# Patient Record
Sex: FEMALE | Race: OTHER | HISPANIC OR LATINO | Employment: FULL TIME | URBAN - METROPOLITAN AREA
[De-identification: names, ages, dates, MRNs, and addresses within clinical notes are randomized per-mention and may not be internally consistent; named-entity substitution may affect disease eponyms.]

---

## 2022-04-10 ENCOUNTER — HISTORICAL (OUTPATIENT)
Dept: ADMINISTRATIVE | Facility: HOSPITAL | Age: 22
End: 2022-04-10

## 2022-04-28 VITALS
DIASTOLIC BLOOD PRESSURE: 66 MMHG | SYSTOLIC BLOOD PRESSURE: 105 MMHG | WEIGHT: 124.56 LBS | BODY MASS INDEX: 22.92 KG/M2 | OXYGEN SATURATION: 100 % | HEIGHT: 62 IN

## 2022-08-11 ENCOUNTER — OFFICE VISIT (OUTPATIENT)
Dept: INTERNAL MEDICINE | Facility: CLINIC | Age: 22
End: 2022-08-11
Payer: MEDICAID

## 2022-08-11 VITALS
HEART RATE: 56 BPM | DIASTOLIC BLOOD PRESSURE: 64 MMHG | TEMPERATURE: 98 F | SYSTOLIC BLOOD PRESSURE: 99 MMHG | HEIGHT: 62 IN | RESPIRATION RATE: 18 BRPM | BODY MASS INDEX: 25.23 KG/M2 | WEIGHT: 137.13 LBS

## 2022-08-11 DIAGNOSIS — Z13.29 SCREENING FOR THYROID DISORDER: ICD-10-CM

## 2022-08-11 DIAGNOSIS — Z13.220 SCREENING CHOLESTEROL LEVEL: ICD-10-CM

## 2022-08-11 DIAGNOSIS — D57.3 SICKLE CELL TRAIT: ICD-10-CM

## 2022-08-11 DIAGNOSIS — R14.3 EXCESSIVE FLATUS: ICD-10-CM

## 2022-08-11 DIAGNOSIS — Z00.00 WELLNESS EXAMINATION: ICD-10-CM

## 2022-08-11 DIAGNOSIS — Z13.1 SCREENING FOR DIABETES MELLITUS: Primary | ICD-10-CM

## 2022-08-11 DIAGNOSIS — N91.5 OLIGOMENORRHEA, UNSPECIFIED TYPE: ICD-10-CM

## 2022-08-11 DIAGNOSIS — Z11.3 ROUTINE SCREENING FOR STI (SEXUALLY TRANSMITTED INFECTION): ICD-10-CM

## 2022-08-11 DIAGNOSIS — Z72.0 VAPES NICOTINE CONTAINING SUBSTANCE: ICD-10-CM

## 2022-08-11 LAB
C TRACH DNA SPEC QL NAA+PROBE: NOT DETECTED
HIV 1+2 AB+HIV1 P24 AG SERPL QL IA: NONREACTIVE
N GONORRHOEA DNA SPEC QL NAA+PROBE: NOT DETECTED

## 2022-08-11 PROCEDURE — 1159F PR MEDICATION LIST DOCUMENTED IN MEDICAL RECORD: ICD-10-PCS | Mod: CPTII,,, | Performed by: NURSE PRACTITIONER

## 2022-08-11 PROCEDURE — 3078F DIAST BP <80 MM HG: CPT | Mod: CPTII,,, | Performed by: NURSE PRACTITIONER

## 2022-08-11 PROCEDURE — 1160F PR REVIEW ALL MEDS BY PRESCRIBER/CLIN PHARMACIST DOCUMENTED: ICD-10-PCS | Mod: CPTII,,, | Performed by: NURSE PRACTITIONER

## 2022-08-11 PROCEDURE — 3078F PR MOST RECENT DIASTOLIC BLOOD PRESSURE < 80 MM HG: ICD-10-PCS | Mod: CPTII,,, | Performed by: NURSE PRACTITIONER

## 2022-08-11 PROCEDURE — 36415 COLL VENOUS BLD VENIPUNCTURE: CPT | Performed by: NURSE PRACTITIONER

## 2022-08-11 PROCEDURE — 87389 HIV-1 AG W/HIV-1&-2 AB AG IA: CPT | Performed by: NURSE PRACTITIONER

## 2022-08-11 PROCEDURE — 3074F PR MOST RECENT SYSTOLIC BLOOD PRESSURE < 130 MM HG: ICD-10-PCS | Mod: CPTII,,, | Performed by: NURSE PRACTITIONER

## 2022-08-11 PROCEDURE — 3008F PR BODY MASS INDEX (BMI) DOCUMENTED: ICD-10-PCS | Mod: CPTII,,, | Performed by: NURSE PRACTITIONER

## 2022-08-11 PROCEDURE — 3074F SYST BP LT 130 MM HG: CPT | Mod: CPTII,,, | Performed by: NURSE PRACTITIONER

## 2022-08-11 PROCEDURE — 99214 OFFICE O/P EST MOD 30 MIN: CPT | Mod: PBBFAC | Performed by: NURSE PRACTITIONER

## 2022-08-11 PROCEDURE — 99213 PR OFFICE/OUTPT VISIT, EST, LEVL III, 20-29 MIN: ICD-10-PCS | Mod: S$PBB,,, | Performed by: NURSE PRACTITIONER

## 2022-08-11 PROCEDURE — 1160F RVW MEDS BY RX/DR IN RCRD: CPT | Mod: CPTII,,, | Performed by: NURSE PRACTITIONER

## 2022-08-11 PROCEDURE — 99213 OFFICE O/P EST LOW 20 MIN: CPT | Mod: S$PBB,,, | Performed by: NURSE PRACTITIONER

## 2022-08-11 PROCEDURE — 87591 N.GONORRHOEAE DNA AMP PROB: CPT | Performed by: NURSE PRACTITIONER

## 2022-08-11 PROCEDURE — 3008F BODY MASS INDEX DOCD: CPT | Mod: CPTII,,, | Performed by: NURSE PRACTITIONER

## 2022-08-11 PROCEDURE — 87491 CHLMYD TRACH DNA AMP PROBE: CPT | Performed by: NURSE PRACTITIONER

## 2022-08-11 PROCEDURE — 1159F MED LIST DOCD IN RCRD: CPT | Mod: CPTII,,, | Performed by: NURSE PRACTITIONER

## 2022-08-11 NOTE — PROGRESS NOTES
TRINI Marie   [facility]  [address] [facility] [address]      PATIENT NAME: Stefania Padilla  : 2000  DATE: 22  MRN: [id number]      Billing Provider: TRINI Marie  Level of Service: LA OFFICE/OUTPT VISIT, EST, LEVL III, 20-29 MIN  Patient PCP Information     Provider PCP Type    TRINI Marie General          Reason for Visit / Chief Complaint: Establish Care       History of Present Illness / Problem Focused Workflow     Stefania Padilla is a 22 y.o. [place] female presents to the clinic to establish PCP in [facility]. PMH sickle cell trait, and irregular cycles. Has not seen provider since moving to  2.5 yrs ago. Does not take any meds daily. Reports may have a cycle this month and may not have again for 3-4 months. Split second flutter, like heart skips a beat, onset 2 months, once a week. Reports bloating and gas with eating lettuce and fruits. Used to take med after eating with improvement of symptoms in [place]; does not recall name. Reports daily BM. Is fasting; will have labs drawn today. Denies chest pain, shortness of breath, cough, fever, headache, dizziness, weakness, abdominal pain, nausea, vomiting, diarrhea, constipation, dysuria, depression, anxiety, SI/HI.    Review of Systems     Review of Systems   Constitutional: Negative.    HENT: Negative.    Eyes: Negative.    Respiratory: Negative.    Cardiovascular: Negative.    Gastrointestinal: Negative.    Endocrine: Negative.    Genitourinary: Negative.    Musculoskeletal: Negative.    Integumentary:  Negative.   Neurological: Negative.    Psychiatric/Behavioral: Negative.         Medical / Social / Family History     Past Medical History:   Diagnosis Date    Depression     Dx few years ago, feels fine now    Sickle cell trait        History reviewed. No pertinent surgical history.    Social History  Ms. Padilla  reports that she  "has been smoking vaping with nicotine. She has never used smokeless tobacco. She reports current alcohol use. She reports that she does not use drugs.    Family History  Ms. Padilla's family history includes Diabetes in her maternal grandfather and maternal grandmother; Hypertension in her father, maternal grandfather, and maternal grandmother.    Medications and Allergies     Medications    Does not take any meds daily.    Allergies  Review of patient's allergies indicates:   Allergen Reactions    Furoxone [furazolidone]     Pineapple Itching       Physical Examination   Vital Signs  Temp: 98.2 °F (36.8 °C)  Temp src: Oral  Pulse: (!) 56  Resp: 18  BP: 99/64  BP Location: Left arm  Patient Position: Sitting  Pain Score: 0-No pain  Height and Weight  Height: 5' 2.21" (158 cm)  Weight: 62.2 kg (137 lb 2 oz)  BSA (Calculated - sq m): 1.65 sq meters  BMI (Calculated): 24.9  Weight in (lb) to have BMI = 25: 137.3]   Physical Exam  Vitals reviewed.   Constitutional:       Appearance: Normal appearance.   HENT:      Head: Normocephalic.   Eyes:      Pupils: Pupils are equal, round, and reactive to light.   Cardiovascular:      Rate and Rhythm: Normal rate and regular rhythm.      Heart sounds: Normal heart sounds.   Pulmonary:      Effort: Pulmonary effort is normal.      Breath sounds: Normal breath sounds.   Abdominal:      General: Bowel sounds are normal.      Palpations: Abdomen is soft.   Skin:     General: Skin is warm.   Neurological:      Mental Status: She is alert and oriented to person, place, and time.   Psychiatric:         Mood and Affect: Mood normal.         Speech: Speech normal.         Behavior: Behavior is cooperative.         Judgment: Judgment normal.           Results     Lab Results   Component Value Date    WBC 7.8 08/11/2022    RBC 4.39 08/11/2022    HGB 12.9 08/11/2022    HCT 36.4 (L) 08/11/2022    MCV 82.9 08/11/2022    MCH 29.4 08/11/2022    MCHC 35.4 08/11/2022    RDW 12.8 08/11/2022    "  08/11/2022    MPV 10.0 08/11/2022     CMP  Sodium Level   Date Value Ref Range Status   08/11/2022 142 136 - 145 mmol/L Final     Potassium Level   Date Value Ref Range Status   08/11/2022 4.7 3.5 - 5.1 mmol/L Final     Carbon Dioxide   Date Value Ref Range Status   08/11/2022 28 22 - 29 mmol/L Final     Blood Urea Nitrogen   Date Value Ref Range Status   08/11/2022 9.5 7.0 - 18.7 mg/dL Final     Creatinine   Date Value Ref Range Status   08/11/2022 0.67 0.55 - 1.02 mg/dL Final     Calcium Level Total   Date Value Ref Range Status   08/11/2022 9.8 8.4 - 10.2 mg/dL Final     Albumin Level   Date Value Ref Range Status   08/11/2022 4.5 3.5 - 5.0 gm/dL Final     Bilirubin Total   Date Value Ref Range Status   08/11/2022 1.1 <=1.5 mg/dL Final     Alkaline Phosphatase   Date Value Ref Range Status   08/11/2022 78 40 - 150 unit/L Final     Aspartate Aminotransferase   Date Value Ref Range Status   08/11/2022 12 5 - 34 unit/L Final     Alanine Aminotransferase   Date Value Ref Range Status   08/11/2022 10 0 - 55 unit/L Final     Lab Results   Component Value Date    CHOL 150 08/11/2022     Lab Results   Component Value Date    HDL 45 08/11/2022     No results found for: LDLCALC  Lab Results   Component Value Date    TRIG 77 08/11/2022     No results found for: CHOLHDL  Lab Results   Component Value Date    TSH 4.3457 08/11/2022     Lab Results   Component Value Date    PROTEINUA Negative 08/11/2022    LEUKOCYTESUR Negative 08/11/2022           Assessment and Plan (including Health Maintenance)     Plan: Virtual visit in 3-4 weeks and prn. Labs to be drawn prior to appt.         Health Maintenance Due   Topic Date Due    Pneumococcal Vaccines (Age 0-64) (1 - PCV) Never done    HPV Vaccines (1 - 2-dose series) Never done    TETANUS VACCINE  Never done    COVID-19 Vaccine (3 - Booster for Pfizer series) 12/13/2021    Pap Smear  Never done       Problem List Items Addressed This Visit        Renal/     Oligomenorrhea    Current Assessment & Plan     Referral to GYN to eval/treat.  Keep appt when scheduled.           Relevant Orders    Ambulatory referral/consult to Gynecology    TSH (Completed)    T4, Free (Completed)       Oncology    Sickle cell trait       GI    Excessive flatus    Current Assessment & Plan     Encouraged to take gas x with meals.   Avoid foods that cause excessive gas.              Other    Vapes nicotine containing substance    Current Assessment & Plan     Vapes daily.  Not ready to quit.   Discussed benefits of quitting including improved health, decreased cardiac/vascular/pulmonary/stroke risks as well as saving money.             Wellness examination    Current Assessment & Plan     Eye Exam -   Dental Exam -   Vaccinations: Flu - / Covid - 6/22/21 & 7/13/22 / Tetanus -  Labwork - ordered; pt is fasting.             Relevant Orders    CBC Auto Differential (Completed)    Comprehensive Metabolic Panel (Completed)    Urinalysis, Reflex to Urine Culture Urine, Clean Catch (Completed)      Other Visit Diagnoses     Screening for diabetes mellitus    -  Primary    Relevant Orders    Hemoglobin A1C (Completed)    Screening cholesterol level        Relevant Orders    Lipid Panel (Completed)    Screening for thyroid disorder        Routine screening for STI (sexually transmitted infection)        Relevant Orders    Chlamydia/GC, PCR (Completed)    Hepatitis Panel, Acute (Completed)    HIV 1/2 Ag/Ab (4th Gen) (Completed)    SYPHILIS ANTIBODY (WITH REFLEX RPR) (Completed)          Health Maintenance Topics with due status: Not Due       Topic Last Completion Date    Influenza Vaccine Not Due       Future Appointments   Date Time Provider Department Center   9/14/2022 12:45 PM [facility]             Signature:  TRINI Marie  [facility]  [address] [facility] [address]    Date of encounter:  8/11/22

## 2022-08-11 NOTE — ASSESSMENT & PLAN NOTE
Eye Exam -   Dental Exam -   Vaccinations: Flu - / Covid - 6/22/21 & 7/13/22 / Tetanus -  Labwork - ordered; pt is fasting.

## 2022-08-11 NOTE — ASSESSMENT & PLAN NOTE
Vapes daily.  Not ready to quit.   Discussed benefits of quitting including improved health, decreased cardiac/vascular/pulmonary/stroke risks as well as saving money.

## 2022-09-09 ENCOUNTER — TELEPHONE (OUTPATIENT)
Dept: INTERNAL MEDICINE | Facility: CLINIC | Age: 22
End: 2022-09-09
Payer: MEDICAID

## 2022-09-09 NOTE — TELEPHONE ENCOUNTER
Placed call to patient to inform her that appointment scheduled 9/14/22 has been rescheduled to to provider will not be in clinic and her new appointment will be 10/3/22 @ 9:15am. Patient did not answer, left message on voicemail.

## 2022-10-03 ENCOUNTER — OFFICE VISIT (OUTPATIENT)
Dept: INTERNAL MEDICINE | Facility: CLINIC | Age: 22
End: 2022-10-03
Payer: MEDICAID

## 2022-10-03 DIAGNOSIS — R00.2 PALPITATIONS: Primary | ICD-10-CM

## 2022-10-03 DIAGNOSIS — Z72.0 VAPES NICOTINE CONTAINING SUBSTANCE: ICD-10-CM

## 2022-10-03 DIAGNOSIS — N91.5 OLIGOMENORRHEA, UNSPECIFIED TYPE: ICD-10-CM

## 2022-10-03 DIAGNOSIS — R14.3 EXCESSIVE FLATUS: ICD-10-CM

## 2022-10-03 PROCEDURE — 1159F PR MEDICATION LIST DOCUMENTED IN MEDICAL RECORD: ICD-10-PCS | Mod: CPTII,95,, | Performed by: NURSE PRACTITIONER

## 2022-10-03 PROCEDURE — 99213 PR OFFICE/OUTPT VISIT, EST, LEVL III, 20-29 MIN: ICD-10-PCS | Mod: 95,,, | Performed by: NURSE PRACTITIONER

## 2022-10-03 PROCEDURE — 1159F MED LIST DOCD IN RCRD: CPT | Mod: CPTII,95,, | Performed by: NURSE PRACTITIONER

## 2022-10-03 PROCEDURE — 99213 OFFICE O/P EST LOW 20 MIN: CPT | Mod: 95,,, | Performed by: NURSE PRACTITIONER

## 2022-10-03 NOTE — PROGRESS NOTES
Audio Only Telehealth Visit     The patient location is: on way to airport; in [place] - passenger, not   The chief complaint leading to consultation is: palpitations  Visit type: Virtual visit with audio only (telephone)  Total time spent with patient: 25 mins     The reason for the audio only service rather than synchronous audio and video virtual visit was related to technical difficulties or patient preference/necessity.     Each patient to whom I provide medical services by telemedicine is:  (1) informed of the relationship between the physician and patient and the respective role of any other health care provider with respect to management of the patient; and (2) notified that they may decline to receive medical services by telemedicine and may withdraw from such care at any time. Patient verbally consented to receive this service via voice-only telephone call.    Billing Provider: TRINI Marie  Level of Service: [place] OFFICE/OUTPT VISIT, EST, LEVL III, 20-29 MIN  Patient PCP Information       Provider PCP Type    TRINI Marie General            Reason for Visit / Chief Complaint: Follow-up (Lab results)       History of Present Illness / Problem Focused Workflow     Stefania Padilla is a 22 y.o. [place]an female for lab results. PMH sickle cell trait, and irregular cycles. Labs reviewed with pt. Reports continued palpitations throughout the day with has increased before bed; has not reported for eval. Reports not improved with therapeutic measures; does endorse some anxiety. Amendable to further workup after return from trip. Gas has improved with gas X and reduction in gaseous foods. Lab results discussed with pt. Denies chest pain, shortness of breath, cough, fever, headache, dizziness, weakness, abdominal pain, nausea, vomiting, diarrhea, constipation, dysuria, depression, anxiety, SI/HI.    Review of Systems     Review of Systems   Constitutional: Negative.    HENT:  Negative.     Eyes: Negative.    Respiratory: Negative.     Cardiovascular:  Positive for palpitations.   Gastrointestinal: Negative.    Endocrine: Negative.    Genitourinary: Negative.    Musculoskeletal: Negative.    Integumentary:  Negative.   Neurological: Negative.    Psychiatric/Behavioral:  The patient is nervous/anxious.       Medical / Social / Family History     Past Medical History:   Diagnosis Date    Depression     Dx few years ago, feels fine now    Sickle cell trait        History reviewed. No pertinent surgical history.    Social History  Ms. Padilla reports that she has been smoking vaping with nicotine. She has never used smokeless tobacco. She reports current alcohol use. She reports that she does not use drugs.    Family History  's family history includes Diabetes in her maternal grandfather and maternal grandmother; Hypertension in her father, maternal grandfather, and maternal grandmother.    Medications and Allergies     Medications    Does not take any routine meds.       Allergies  Review of patient's allergies indicates:   Allergen Reactions    Furoxone [furazolidone]     Pineapple Itching       Physical Examination   Vital Signs  Pain Score: 0-No pain]    Physical Exam  Neurological:      Mental Status: She is alert and oriented to person, place, and time.   Psychiatric:         Mood and Affect: Mood normal.         Speech: Speech normal.         Behavior: Behavior normal. Behavior is cooperative.         Thought Content: Thought content normal.         Judgment: Judgment normal.       Results     Lab Results   Component Value Date    WBC 7.8 08/11/2022    RBC 4.39 08/11/2022    HGB 12.9 08/11/2022    HCT 36.4 (L) 08/11/2022    MCV 82.9 08/11/2022    MCH 29.4 08/11/2022    MCHC 35.4 08/11/2022    RDW 12.8 08/11/2022     08/11/2022    MPV 10.0 08/11/2022     Sodium Level   Date Value Ref Range Status   08/11/2022 142 136 - 145 mmol/L Final     Potassium Level   Date Value  Ref Range Status   08/11/2022 4.7 3.5 - 5.1 mmol/L Final     Carbon Dioxide   Date Value Ref Range Status   08/11/2022 28 22 - 29 mmol/L Final     Blood Urea Nitrogen   Date Value Ref Range Status   08/11/2022 9.5 7.0 - 18.7 mg/dL Final     Creatinine   Date Value Ref Range Status   08/11/2022 0.67 0.55 - 1.02 mg/dL Final     Calcium Level Total   Date Value Ref Range Status   08/11/2022 9.8 8.4 - 10.2 mg/dL Final     Albumin Level   Date Value Ref Range Status   08/11/2022 4.5 3.5 - 5.0 gm/dL Final     Bilirubin Total   Date Value Ref Range Status   08/11/2022 1.1 <=1.5 mg/dL Final     Alkaline Phosphatase   Date Value Ref Range Status   08/11/2022 78 40 - 150 unit/L Final     Aspartate Aminotransferase   Date Value Ref Range Status   08/11/2022 12 5 - 34 unit/L Final     Alanine Aminotransferase   Date Value Ref Range Status   08/11/2022 10 0 - 55 unit/L Final     Lab Results   Component Value Date    CHOL 150 08/11/2022     Lab Results   Component Value Date    HDL 45 08/11/2022     No results found for: LDLCALC  Lab Results   Component Value Date    TRIG 77 08/11/2022     No results found for: CHOLHDL  Lab Results   Component Value Date    TSH 4.3457 08/11/2022     Lab Results   Component Value Date    PROTEINUA Negative 08/11/2022    LEUKOCYTESUR Negative 08/11/2022     Lab Results   Component Value Date    HGBA1C 4.7 08/11/2022     No results found for: MICROALBUR, SYJA50VLX   No results found for this or any previous visit.         Assessment and Plan (including Health Maintenance)     Plan: RTC 4 weeks and prn. Echo/ekg must be completed.        Health Maintenance Due   Topic Date Due    Pneumococcal Vaccines (Age 0-64) (1 - PCV) Never done    HPV Vaccines (1 - 2-dose series) Never done    TETANUS VACCINE  Never done    COVID-19 Vaccine (3 - Booster for Pfizer series) 09/07/2021    Pap Smear  Never done    Influenza Vaccine (1) Never done       Problem List Items Addressed This Visit           Cardiac/Vascular    Palpitations - Primary    Current Assessment & Plan     EKG, echo and labs ordered.  Keep appts as scheduled.   ED precautions.  Vaping cessation discussed.         Relevant Orders    SCHEDULED EKG 12-LEAD (to Muse)    Echo       Renal/    Oligomenorrhea    Current Assessment & Plan     Keep GYN appt as scheduled later this month.            GI    Excessive flatus    Current Assessment & Plan     Continue OTC gas x prn.  Decrease dairy products.            Other    Vapes nicotine containing substance    Current Assessment & Plan     Continues to vape daily.  Not currently ready to quit.             The patient has no Health Maintenance topics of status Not Due    Future Appointments   Date Time Provider Department Center   10/26/2022  9:15 AM [facility], [facility] GYN [facility] GYN [place]    10/27/2022 11:30 AM [facility] ECHO 02 [facility] ECHO [place]    11/2/2022  9:00 AM TRINI Baires [facility] [place]            Signature:  TRINI Marie  [facility]  [address] [facility] [facility] [address]    Date of encounter: 10/3/22              This service was not originating from a related E/M service provided within the previous 7 days nor will  to an E/M service or procedure within the next 24 hours or my soonest available appointment.  Prevailing standard of care was able to be met in this audio-only visit.

## 2022-10-08 PROBLEM — R00.2 PALPITATIONS: Status: ACTIVE | Noted: 2022-10-08

## 2022-10-08 NOTE — ASSESSMENT & PLAN NOTE
EKG, echo and labs ordered.  Keep appts as scheduled.   ED precautions.  Vaping cessation discussed.

## 2022-10-26 ENCOUNTER — OFFICE VISIT (OUTPATIENT)
Dept: GYNECOLOGY | Facility: CLINIC | Age: 22
End: 2022-10-26
Payer: MEDICAID

## 2022-10-26 VITALS
TEMPERATURE: 98 F | SYSTOLIC BLOOD PRESSURE: 100 MMHG | WEIGHT: 139 LBS | DIASTOLIC BLOOD PRESSURE: 65 MMHG | HEART RATE: 85 BPM | BODY MASS INDEX: 25.58 KG/M2 | HEIGHT: 62 IN | RESPIRATION RATE: 20 BRPM | OXYGEN SATURATION: 100 %

## 2022-10-26 DIAGNOSIS — N94.6 DYSMENORRHEA: ICD-10-CM

## 2022-10-26 DIAGNOSIS — Z12.4 SCREENING FOR CERVICAL CANCER: Primary | ICD-10-CM

## 2022-10-26 PROCEDURE — 87624 HPV HI-RISK TYP POOLED RSLT: CPT

## 2022-10-26 PROCEDURE — 99214 OFFICE O/P EST MOD 30 MIN: CPT | Mod: PBBFAC

## 2022-10-26 RX ORDER — ETONOGESTREL AND ETHINYL ESTRADIOL VAGINAL RING .015; .12 MG/D; MG/D
1 RING VAGINAL
Qty: 1 EACH | Refills: 11 | Status: SHIPPED | OUTPATIENT
Start: 2022-10-26 | End: 2023-10-27

## 2022-10-26 NOTE — PROGRESS NOTES
"    [facility] GYNECOLOGY CLINIC NOTE     Stefania Padilla is a 22 y.o.  presenting to GYN clinic for dysmenorrhea.    Cycles were irregular last year stating cycles every other month, however since April cycles have been monthly, lasting 6 days. Cycles are not heavy however very painful. Pt reports no relief with midol/ibuprofen or tylenol. Currently sexually active, one female partner in the last year.        Gynecology  Never had a pap smear  Recent STI testing 2022, negative   OB History          0    Para   0    Term   0       0    AB   0    Living   0         SAB   0    IAB   0    Ectopic   0    Multiple   0    Live Births   0                Past Medical History:   Diagnosis Date             Sickle cell trait       History reviewed. No pertinent surgical history.   Current Outpatient Medications   Medication Instructions    etonogestreL-ethinyl estradioL (NUVARING) 0.12-0.015 mg/24 hr vaginal ring 1 each, Vaginal, Every 21 days, Insert one (1) ring vaginally and leave in place for three (3) weeks, then remove for one (1) week.     Social History     Tobacco Use    Smoking status: Every Day     Types: Vaping with nicotine    Smokeless tobacco: Never   Substance Use Topics    Alcohol use: Yes     Comment: beer 2-4 times monthly    Drug use: Never       Review of Systems  A comprehensive review of systems was negative.     Objective:     /65   Pulse 85   Temp 97.8 °F (36.6 °C)   Resp 20   Ht 5' 2" (1.575 m)   Wt 63 kg (139 lb)   LMP 10/13/2022   SpO2 100%   BMI 25.42 kg/m²   Physical Exam:  Gen: Well-nourished, well-developed female appearing stated age. Alert, cooperative, in no acute distress.  CV: rrr, no murmurs/rubs/gallops  Chest: ctabl, no wheezes/rales/rhonchi  Abdomen: Soft, non-tender, no masses.  Extrem: Extremities normal, atraumatic, non-tender calves.  External genitalia: Normal female genetalia without lesion, discharge or tenderness.   Speculum Exam: Vaginal " vault without discharge, nonodorous, no lesions/masses seen.  Cervical os visualized as closed, no lesions/masses.   Bimanual Exam: No cervical motion tenderness.  Uterus freely mobile.  Normal size uterus. No adnexal masses.  Nontender exam.   Note: RN chaperone present for entirety of exam.      Assessment:       22 y.o.  here for  1. Screening for cervical cancer  Liquid-Based Pap Smear, Screening Screening      2. Dysmenorrhea  Ambulatory referral/consult to Gynecology             Plan:   - counseled on various hormonal options to assist in reducing dysmenorrhea with cycles. Pt desires to be started on nuvaring. Continue ibuprofen or naproxen prn     Pap collected, will notify of any abnormal results       Problem List Items Addressed This Visit          Renal/    RESOLVED: Oligomenorrhea     Other Visit Diagnoses       Screening for cervical cancer    -  Primary    Relevant Orders    Liquid-Based Pap Smear, Screening Screening            Return to clinic in 1 year for WWE with NP     Discussed patient and plan with Dr. Provost Wang Reid MD   [employer] [facility], PGY4

## 2022-10-27 ENCOUNTER — HOSPITAL ENCOUNTER (OUTPATIENT)
Dept: CARDIOLOGY | Facility: HOSPITAL | Age: 22
Discharge: HOME | End: 2022-10-27
Attending: NURSE PRACTITIONER
Payer: MEDICAID

## 2022-10-27 DIAGNOSIS — R00.2 PALPITATIONS: ICD-10-CM

## 2022-10-27 LAB
AV INDEX (PROSTH): 0.72
AV MEAN GRADIENT: 3 MMHG
AV PEAK GRADIENT: 5 MMHG
AV VALVE AREA: 2.24 CM2
AV VELOCITY RATIO: 0.77
CV ECHO LV RWT: 0.37 CM
DOP CALC AO PEAK VEL: 1.15 M/S
DOP CALC AO VTI: 25.3 CM
DOP CALC LVOT AREA: 3.1 CM2
DOP CALC LVOT DIAMETER: 1.99 CM
DOP CALC LVOT PEAK VEL: 0.89 M/S
DOP CALC LVOT STROKE VOLUME: 56.58 CM3
DOP CALC MV VTI: 18 CM
DOP CALCLVOT PEAK VEL VTI: 18.2 CM
E WAVE DECELERATION TIME: 311.31 MSEC
E/A RATIO: 2.03
ECHO LV POSTERIOR WALL: 0.79 CM (ref 0.6–1.1)
EJECTION FRACTION: 60 %
FRACTIONAL SHORTENING: 33 % (ref 28–44)
HR MV ECHO: 70 BPM
INTERVENTRICULAR SEPTUM: 0.72 CM (ref 0.6–1.1)
LEFT ATRIUM SIZE: 2.75 CM
LEFT INTERNAL DIMENSION IN SYSTOLE: 2.84 CM (ref 2.1–4)
LEFT VENTRICLE DIASTOLIC VOLUME: 81.72 ML
LEFT VENTRICLE SYSTOLIC VOLUME: 30.62 ML
LEFT VENTRICULAR INTERNAL DIMENSION IN DIASTOLE: 4.27 CM (ref 3.5–6)
LEFT VENTRICULAR MASS: 96.48 G
LV LATERAL E/E' RATIO: 2.95 M/S
LVOT MG: 1.59 MMHG
LVOT MV: 0.57 CM/S
MV MEAN GRADIENT: 1 MMHG
MV PEAK A VEL: 0.29 M/S
MV PEAK E VEL: 0.59 M/S
MV PEAK GRADIENT: 2 MMHG
MV STENOSIS PRESSURE HALF TIME: 90.28 MS
MV VALVE AREA BY CONTINUITY EQUATION: 3.14 CM2
MV VALVE AREA P 1/2 METHOD: 2.44 CM2
PISA TR MAX VEL: 2.19 M/S
RA WIDTH: 3.4 CM
TDI LATERAL: 0.2 M/S
TR MAX PG: 19 MMHG
TRICUSPID ANNULAR PLANE SYSTOLIC EXCURSION: 1.59 CM

## 2022-10-27 PROCEDURE — 93306 TTE W/DOPPLER COMPLETE: CPT

## 2022-10-27 PROCEDURE — 93005 ELECTROCARDIOGRAM TRACING: CPT

## 2022-11-01 LAB
HPV16+18+H RISK 12 DNA CVX-IMP: NEGATIVE
INSULIN SERPL-ACNC: NORMAL U[IU]/ML
LAB AP BETHESDA CATEGORY: NORMAL
LAB AP CONTRACEPTIVES: NORMAL
LAB AP GYN MOLECULAR TESTING: NORMAL
LAB AP LMP DATE: NORMAL
LAB AP OCHS PAP SPECIMEN ADEQUACY: NORMAL
LAB AP OHS PAP INTERPRETATION: NORMAL
LAB AP PAP DISCLAIMER COMMENTS: NORMAL

## 2022-11-02 ENCOUNTER — OFFICE VISIT (OUTPATIENT)
Dept: INTERNAL MEDICINE | Facility: CLINIC | Age: 22
End: 2022-11-02
Payer: MEDICAID

## 2022-11-02 VITALS
WEIGHT: 140.38 LBS | TEMPERATURE: 98 F | DIASTOLIC BLOOD PRESSURE: 64 MMHG | RESPIRATION RATE: 20 BRPM | HEIGHT: 62 IN | BODY MASS INDEX: 25.83 KG/M2 | HEART RATE: 69 BPM | SYSTOLIC BLOOD PRESSURE: 95 MMHG

## 2022-11-02 DIAGNOSIS — R00.2 PALPITATIONS: Primary | ICD-10-CM

## 2022-11-02 DIAGNOSIS — F41.9 ANXIETY: ICD-10-CM

## 2022-11-02 PROCEDURE — 3008F BODY MASS INDEX DOCD: CPT | Mod: CPTII,,, | Performed by: NURSE PRACTITIONER

## 2022-11-02 PROCEDURE — 3078F DIAST BP <80 MM HG: CPT | Mod: CPTII,,, | Performed by: NURSE PRACTITIONER

## 2022-11-02 PROCEDURE — 1159F MED LIST DOCD IN RCRD: CPT | Mod: CPTII,,, | Performed by: NURSE PRACTITIONER

## 2022-11-02 PROCEDURE — 3078F PR MOST RECENT DIASTOLIC BLOOD PRESSURE < 80 MM HG: ICD-10-PCS | Mod: CPTII,,, | Performed by: NURSE PRACTITIONER

## 2022-11-02 PROCEDURE — 3074F SYST BP LT 130 MM HG: CPT | Mod: CPTII,,, | Performed by: NURSE PRACTITIONER

## 2022-11-02 PROCEDURE — 3008F PR BODY MASS INDEX (BMI) DOCUMENTED: ICD-10-PCS | Mod: CPTII,,, | Performed by: NURSE PRACTITIONER

## 2022-11-02 PROCEDURE — 99214 OFFICE O/P EST MOD 30 MIN: CPT | Mod: PBBFAC | Performed by: NURSE PRACTITIONER

## 2022-11-02 PROCEDURE — 3074F PR MOST RECENT SYSTOLIC BLOOD PRESSURE < 130 MM HG: ICD-10-PCS | Mod: CPTII,,, | Performed by: NURSE PRACTITIONER

## 2022-11-02 PROCEDURE — 1159F PR MEDICATION LIST DOCUMENTED IN MEDICAL RECORD: ICD-10-PCS | Mod: CPTII,,, | Performed by: NURSE PRACTITIONER

## 2022-11-02 PROCEDURE — 99214 OFFICE O/P EST MOD 30 MIN: CPT | Mod: S$PBB,,, | Performed by: NURSE PRACTITIONER

## 2022-11-02 PROCEDURE — 99214 PR OFFICE/OUTPT VISIT, EST, LEVL IV, 30-39 MIN: ICD-10-PCS | Mod: S$PBB,,, | Performed by: NURSE PRACTITIONER

## 2022-11-02 RX ORDER — BUSPIRONE HYDROCHLORIDE 5 MG/1
5 TABLET ORAL EVERY 8 HOURS PRN
Qty: 60 TABLET | Refills: 0 | Status: SHIPPED | OUTPATIENT
Start: 2022-11-02 | End: 2023-04-04 | Stop reason: SDUPTHER

## 2022-11-02 NOTE — PROGRESS NOTES
TRINI Marie   [facility]  [address] [address]      PATIENT NAME: Stefania Padilla  : 2000  DATE: 22  MRN: [id number]      Billing Provider: TRINI Marie  Level of Service: UT OFFICE/OUTPT VISIT, EST, LEVL IV, 30-39 MIN  Patient PCP Information       Provider PCP Type    TRINI Marie General            Reason for Visit / Chief Complaint: Follow-up (Test results, wants flu vaccine )       History of Present Illness / Problem Focused Workflow     Stefania Padilla is a 22 y.o. [place] female presents to the clinic for palpitations. Has established care with [facility] clinic. Echo and EKG results discussed with pt. Continues to endorse frequent palpitations. Does admit to some daily anxiety and inattention with racing thoughts. Gets told often at work that she does not complete one task prior to moving on to the next and worries a lot. Pt does not desire to take any meds daily as she doesn't feel she's responsible enough to remember to take it. Would like to try a prn med for anxiety after discussion of options. Denies chest pain, shortness of breath, cough, fever, headache, dizziness, weakness, abdominal pain, nausea, vomiting, diarrhea, constipation, dysuria, depression, SI/HI.    Review of Systems     Review of Systems   Constitutional: Negative.    HENT: Negative.     Eyes: Negative.    Respiratory: Negative.     Cardiovascular:  Positive for palpitations.   Gastrointestinal: Negative.    Endocrine: Negative.    Genitourinary: Negative.    Musculoskeletal: Negative.    Integumentary:  Negative.   Neurological: Negative.    Psychiatric/Behavioral:  The patient is nervous/anxious.       Medical / Social / Family History     Past Medical History:   Diagnosis Date    Depression     Dx few years ago, feels fine now    Sickle cell trait        History reviewed. No pertinent surgical  "history.    Social History  Ms. Padilla reports that she has been smoking vaping with nicotine. She has never used smokeless tobacco. She reports current alcohol use. She reports that she does not use drugs.    Family History  's family history includes Diabetes in her maternal grandfather and maternal grandmother; Hypertension in her father, maternal grandfather, and maternal grandmother.    Medications and Allergies     Medications  Medication List with Changes/Refills   New Medications    BUSPIRONE (BUSPAR) 5 MG TAB    Take 1 tablet (5 mg total) by mouth every 8 (eight) hours as needed (anxiety).   Current Medications    ETONOGESTREL-ETHINYL ESTRADIOL (NUVARING) 0.12-0.015 MG/24 HR VAGINAL RING    Place 1 each vaginally every 21 days. Insert one (1) ring vaginally and leave in place for three (3) weeks, then remove for one (1) week.         Allergies  Review of patient's allergies indicates:   Allergen Reactions    Furoxone [furazolidone]     Pineapple Itching       Physical Examination   Vital Signs  Temp: 97.7 °F (36.5 °C)  Temp src: Oral  Pulse: 69  Resp: 20  BP: 95/64  BP Location: Left arm  Patient Position: Sitting  Pain Score: 0-No pain  Height and Weight  Height: 5' 2.01" (157.5 cm)  Weight: 63.7 kg (140 lb 6.4 oz)  BSA (Calculated - sq m): 1.67 sq meters  BMI (Calculated): 25.7  Weight in (lb) to have BMI = 25: 136.4]   Physical Exam  Vitals reviewed.   Constitutional:       Appearance: Normal appearance.   HENT:      Head: Normocephalic.   Eyes:      Pupils: Pupils are equal, round, and reactive to light.   Cardiovascular:      Rate and Rhythm: Normal rate and regular rhythm.      Heart sounds: Normal heart sounds.   Pulmonary:      Effort: Pulmonary effort is normal.      Breath sounds: Normal breath sounds.   Abdominal:      General: Bowel sounds are normal.      Palpations: Abdomen is soft.   Skin:     General: Skin is warm.   Neurological:      Mental Status: She is alert and oriented to " person, place, and time.   Psychiatric:         Mood and Affect: Mood normal.         Speech: Speech normal.         Behavior: Behavior is cooperative.         Judgment: Judgment normal.         Results     Lab Results   Component Value Date    WBC 7.8 08/11/2022    RBC 4.39 08/11/2022    HGB 12.9 08/11/2022    HCT 36.4 (L) 08/11/2022    MCV 82.9 08/11/2022    MCH 29.4 08/11/2022    MCHC 35.4 08/11/2022    RDW 12.8 08/11/2022     08/11/2022    MPV 10.0 08/11/2022     CMP  Sodium Level   Date Value Ref Range Status   08/11/2022 142 136 - 145 mmol/L Final     Potassium Level   Date Value Ref Range Status   08/11/2022 4.7 3.5 - 5.1 mmol/L Final     Carbon Dioxide   Date Value Ref Range Status   08/11/2022 28 22 - 29 mmol/L Final     Blood Urea Nitrogen   Date Value Ref Range Status   08/11/2022 9.5 7.0 - 18.7 mg/dL Final     Creatinine   Date Value Ref Range Status   08/11/2022 0.67 0.55 - 1.02 mg/dL Final     Calcium Level Total   Date Value Ref Range Status   08/11/2022 9.8 8.4 - 10.2 mg/dL Final     Albumin Level   Date Value Ref Range Status   08/11/2022 4.5 3.5 - 5.0 gm/dL Final     Bilirubin Total   Date Value Ref Range Status   08/11/2022 1.1 <=1.5 mg/dL Final     Alkaline Phosphatase   Date Value Ref Range Status   08/11/2022 78 40 - 150 unit/L Final     Aspartate Aminotransferase   Date Value Ref Range Status   08/11/2022 12 5 - 34 unit/L Final     Alanine Aminotransferase   Date Value Ref Range Status   08/11/2022 10 0 - 55 unit/L Final     Lab Results   Component Value Date    CHOL 150 08/11/2022     Lab Results   Component Value Date    HDL 45 08/11/2022     No results found for: LDLCALC  Lab Results   Component Value Date    TRIG 77 08/11/2022     No results found for: CHOLHDL  Lab Results   Component Value Date    TSH 4.3457 08/11/2022     Lab Results   Component Value Date    PROTEINUA Negative 08/11/2022    LEUKOCYTESUR Negative 08/11/2022     Transthoracic echo (TTE) complete  Order#  [id number]  Reading physician: Ethan Wen MD Ordering physician: TRINI Baires Study date: 10/27/22     Reason for Exam  Priority: Routine  Dx: Palpitations [R00.2 (ICD-10-CM)]     View Images Vital Vitrea     Show images for Echo  Summary    The left ventricle is normal in size with normal systolic function.  The estimated ejection fraction is 60%.  Normal left ventricular diastolic function.  Normal right ventricular size with normal right ventricular systolic function.  There is no pulmonary hypertension.     Vitals    Height Weight BMI (Calculated) BSA (Calculated - sq m) BP Pulse             Echocardiography Findings      Left Ventricle    The left ventricle is normal in size with normal systolic function. The estimated ejection fraction is 60%. There is normal diastolic function.     Right Ventricle    Normal cavity size and systolic function.     Left Atrium    The left atrial volume index is normal.     Right Atrium    The right atrium is normal in size.     Aortic Valve    The aortic valve appears structurally normal. The valve is trileaflet. Trace regurgitation.     Mitral Valve    The mean pressure gradient across the mitral valve is 1 mmHg at a heart rate of 70 bpm. The mitral valve appears structurally normal. There is trace mitral valve regurgitation. The estimated mitral valve area by pressure half time is 2.44 cm2.     Tricuspid Valve    The tricuspid valve appears structurally normal. There is trace regurgitation. There is no pulmonary hypertension. The estimated PA systolic pressure is greater than 19 mmHg.     Pulmonic Valve    Pulmonic valve is structurally normal. Trace regurgitation.     IVC/SVC    IVC normal.       Exam Details    Performed Procedure Technologist     Transthoracic echo (TTE) complete RT Elena           Begin Exam End Exam     10/27/2022 11:25 AM 10/27/2022 11:54 AM              2D Measurements    Dimensions   LVIDd 4.27 cm         LVIDs 2.84 cm         IVS  0.72 cm         Posterior Wall 0.79 cm         FS 33 %         LA size 2.75 cm         LV mass 96.48 g          Dimensions   TAPSE 1.59 cm         RA Width 3.4 cm                 Doppler Measurements - Diastolic Filling    Diastolic Filling   E/A ratio 2.03           E wave deceleration time 311.31 msec              Doppler Measurements - Aortic Valve    Stenosis   LVOT diameter 1.99 cm         LVOT area 3.1 cm2         LVOT peak christopher 0.89 m/s         LVOT peak VTI 18.2 cm         Ao peak christopher 1.15 m/s         Ao VTI 25.3 cm         AV valve area 2.24 cm2         AV mean gradient 3 mmHg         AV peak gradient 5 mmHg         AV Velocity Ratio 0.77          AV index (prosthetic) 0.72                  Doppler Measurements - Mitral Valve    Stenosis   MV mean gradient 1 mmHg         MV peak gradient 2 mmHg         MV valve area p 1/2 method 2.44 cm2         MV valve area by continuity eq 3.14 cm2          PISA-MS   MV Peak E Christopher 0.59 m/s                 Doppler Measurements - Shunt Ratio    Shunt Ratio   LVOT stroke volume 56.58 cm3               Assessment and Plan (including Health Maintenance)     Plan: Virtual visit in 4 weeks and prn.         Health Maintenance Due   Topic Date Due    Pneumococcal Vaccines (Age 0-64) (1 - PCV) Never done    HPV Vaccines (1 - 2-dose series) Never done    Chlamydia Screening  Never done    TETANUS VACCINE  Never done    COVID-19 Vaccine (3 - Booster for Pfizer series) 09/07/2021    Influenza Vaccine (1) Never done       Problem List Items Addressed This Visit          Psychiatric    Anxiety    Current Assessment & Plan     Rx buspar 5 mg TID prn.  Take meds as prescribed.  Practice deep breathing or abdominal breathing exercises when anxiety occurs.  Exercise daily. Get sunlight daily.  Avoid caffeine, alcohol and stimulants.  Do not use illicit drugs.  Practice positive phrases and repeat throughout the day, yoga, lavender scents or Chamomile tea will help anxiety.  Set healthy  boundaries, avoid people and conversations that increase stress.  Reports any symptoms of suicidal or homicidal ideations immediately, go to nearest emergency room.              Cardiac/Vascular    Palpitations - Primary    Current Assessment & Plan     EKG and Echo unremarkable.  Possible cause anxiety or ADHD.  Will continue to monitor.             Health Maintenance Topics with due status: Not Due       Topic Last Completion Date    Pap Smear 10/26/2022       Future Appointments   Date Time Provider Department Center   12/1/2022 12:45 PM [facility]    10/27/2023 10:10 AM CÉSAR Bhakta [facility]            Signature:  TRINI Marie  [facility]  [address] [address]    Date of encounter: 11/2/22

## 2022-11-02 NOTE — ASSESSMENT & PLAN NOTE
Rx buspar 5 mg TID prn.  Take meds as prescribed.  Practice deep breathing or abdominal breathing exercises when anxiety occurs.  Exercise daily. Get sunlight daily.  Avoid caffeine, alcohol and stimulants.  Do not use illicit drugs.  Practice positive phrases and repeat throughout the day, yoga, lavender scents or Chamomile tea will help anxiety.  Set healthy boundaries, avoid people and conversations that increase stress.  Reports any symptoms of suicidal or homicidal ideations immediately, go to nearest emergency room.

## 2022-11-16 ENCOUNTER — OFFICE VISIT (OUTPATIENT)
Dept: URGENT CARE | Facility: CLINIC | Age: 22
End: 2022-11-16
Payer: MEDICAID

## 2022-11-16 VITALS
SYSTOLIC BLOOD PRESSURE: 102 MMHG | TEMPERATURE: 98 F | RESPIRATION RATE: 18 BRPM | HEART RATE: 65 BPM | WEIGHT: 142 LBS | DIASTOLIC BLOOD PRESSURE: 65 MMHG | OXYGEN SATURATION: 97 % | HEIGHT: 62 IN | BODY MASS INDEX: 26.13 KG/M2

## 2022-11-16 DIAGNOSIS — B00.1 COLD SORE: Primary | ICD-10-CM

## 2022-11-16 PROBLEM — D64.9 ANEMIA: Status: ACTIVE | Noted: 2022-11-16

## 2022-11-16 PROCEDURE — 99214 OFFICE O/P EST MOD 30 MIN: CPT | Mod: PBBFAC | Performed by: NURSE PRACTITIONER

## 2022-11-16 PROCEDURE — 99213 PR OFFICE/OUTPT VISIT, EST, LEVL III, 20-29 MIN: ICD-10-PCS | Mod: S$PBB,,, | Performed by: NURSE PRACTITIONER

## 2022-11-16 PROCEDURE — 99213 OFFICE O/P EST LOW 20 MIN: CPT | Mod: S$PBB,,, | Performed by: NURSE PRACTITIONER

## 2022-11-16 RX ORDER — MUPIROCIN 20 MG/G
OINTMENT TOPICAL 3 TIMES DAILY
Qty: 22 G | Refills: 0 | Status: SHIPPED | OUTPATIENT
Start: 2022-11-16 | End: 2022-11-23

## 2022-11-16 RX ORDER — VALACYCLOVIR HYDROCHLORIDE 1 G/1
2000 TABLET, FILM COATED ORAL EVERY 12 HOURS
Qty: 4 TABLET | Refills: 0 | Status: SHIPPED | OUTPATIENT
Start: 2022-11-16 | End: 2023-04-04 | Stop reason: SDUPTHER

## 2022-11-17 NOTE — PROGRESS NOTES
"Subjective:       Patient ID: Stefania Padilla is a 22 y.o. female.    Vitals:  height is 5' 2.01" (1.575 m) and weight is 64.4 kg (142 lb). Her oral temperature is 98.4 °F (36.9 °C). Her blood pressure is 102/65 and her pulse is 65. Her respiration is 18 and oxygen saturation is 97%.     Chief Complaint: Mouth Lesions (Since 11/14)    HPI hx notable for anemia. Gets cold sores, takes acyclovir, and is out of medicine. Needs refill.   ROS    Objective:      Physical Exam   Constitutional: She is oriented to person, place, and time. She does not appear ill. normal  HENT:   Head:       Nose: No rhinorrhea or congestion.   Eyes: Conjunctivae are normal. Pupils are equal, round, and reactive to light. Extraocular movement intact   Pulmonary/Chest: Effort normal.   Abdominal: Normal appearance.   Neurological: She is alert and oriented to person, place, and time.   Skin: lesion   Psychiatric: Her behavior is normal. Mood, judgment and thought content normal.   Vitals reviewed.      Assessment:       1. Cold sore          Plan:         Cold sore    Other orders  -     valACYclovir (VALTREX) 1000 MG tablet; Take 2 tablets (2,000 mg total) by mouth every 12 (twelve) hours. for 1 day  Dispense: 4 tablet; Refill: 0  -     mupirocin (BACTROBAN) 2 % ointment; Apply topically 3 (three) times daily. for 7 days  Dispense: 22 g; Refill: 0          Start Valtrex tonight.  Apply Mupirocin for 5-7 days.         "

## 2022-12-01 ENCOUNTER — TELEPHONE (OUTPATIENT)
Dept: INTERNAL MEDICINE | Facility: CLINIC | Age: 22
End: 2022-12-01
Payer: MEDICAID

## 2022-12-01 NOTE — TELEPHONE ENCOUNTER
Unable to contact pt via phone for telemedicine visit on today, left message on voicemail for pt to return call to clinic. Provider aware, will r/s accordingly.

## 2023-01-27 ENCOUNTER — TELEPHONE (OUTPATIENT)
Dept: INTERNAL MEDICINE | Facility: CLINIC | Age: 23
End: 2023-01-27
Payer: MEDICAID

## 2023-01-27 NOTE — TELEPHONE ENCOUNTER
Unable to contact pt via phone for telemed visit on 1/26/22, message was left on VM at that time for pt to return call to clinic.

## 2023-04-04 ENCOUNTER — OFFICE VISIT (OUTPATIENT)
Dept: INTERNAL MEDICINE | Facility: CLINIC | Age: 23
End: 2023-04-04
Payer: MEDICAID

## 2023-04-04 VITALS
BODY MASS INDEX: 26.8 KG/M2 | WEIGHT: 145.63 LBS | HEIGHT: 62 IN | SYSTOLIC BLOOD PRESSURE: 110 MMHG | TEMPERATURE: 98 F | HEART RATE: 77 BPM | DIASTOLIC BLOOD PRESSURE: 62 MMHG

## 2023-04-04 DIAGNOSIS — Z72.0 VAPES NICOTINE CONTAINING SUBSTANCE: Primary | Chronic | ICD-10-CM

## 2023-04-04 DIAGNOSIS — F41.9 ANXIETY: Chronic | ICD-10-CM

## 2023-04-04 DIAGNOSIS — B00.1 HERPES LABIALIS: Chronic | ICD-10-CM

## 2023-04-04 DIAGNOSIS — Z13.220 SCREENING CHOLESTEROL LEVEL: ICD-10-CM

## 2023-04-04 DIAGNOSIS — Z11.3 ROUTINE SCREENING FOR STI (SEXUALLY TRANSMITTED INFECTION): ICD-10-CM

## 2023-04-04 DIAGNOSIS — Z00.00 WELLNESS EXAMINATION: ICD-10-CM

## 2023-04-04 DIAGNOSIS — H10.13 ALLERGIC CONJUNCTIVITIS OF BOTH EYES: ICD-10-CM

## 2023-04-04 PROCEDURE — 3074F PR MOST RECENT SYSTOLIC BLOOD PRESSURE < 130 MM HG: ICD-10-PCS | Mod: CPTII,,, | Performed by: NURSE PRACTITIONER

## 2023-04-04 PROCEDURE — 3008F PR BODY MASS INDEX (BMI) DOCUMENTED: ICD-10-PCS | Mod: CPTII,,, | Performed by: NURSE PRACTITIONER

## 2023-04-04 PROCEDURE — 3074F SYST BP LT 130 MM HG: CPT | Mod: CPTII,,, | Performed by: NURSE PRACTITIONER

## 2023-04-04 PROCEDURE — 3078F PR MOST RECENT DIASTOLIC BLOOD PRESSURE < 80 MM HG: ICD-10-PCS | Mod: CPTII,,, | Performed by: NURSE PRACTITIONER

## 2023-04-04 PROCEDURE — 1159F MED LIST DOCD IN RCRD: CPT | Mod: CPTII,,, | Performed by: NURSE PRACTITIONER

## 2023-04-04 PROCEDURE — 99213 OFFICE O/P EST LOW 20 MIN: CPT | Mod: PBBFAC | Performed by: NURSE PRACTITIONER

## 2023-04-04 PROCEDURE — 99406 BEHAV CHNG SMOKING 3-10 MIN: CPT | Mod: S$PBB,,, | Performed by: NURSE PRACTITIONER

## 2023-04-04 PROCEDURE — 1159F PR MEDICATION LIST DOCUMENTED IN MEDICAL RECORD: ICD-10-PCS | Mod: CPTII,,, | Performed by: NURSE PRACTITIONER

## 2023-04-04 PROCEDURE — 1160F RVW MEDS BY RX/DR IN RCRD: CPT | Mod: CPTII,,, | Performed by: NURSE PRACTITIONER

## 2023-04-04 PROCEDURE — 99214 PR OFFICE/OUTPT VISIT, EST, LEVL IV, 30-39 MIN: ICD-10-PCS | Mod: 25,S$PBB,, | Performed by: NURSE PRACTITIONER

## 2023-04-04 PROCEDURE — 99406 PR TOBACCO USE CESSATION INTERMEDIATE 3-10 MINUTES: ICD-10-PCS | Mod: S$PBB,,, | Performed by: NURSE PRACTITIONER

## 2023-04-04 PROCEDURE — 1160F PR REVIEW ALL MEDS BY PRESCRIBER/CLIN PHARMACIST DOCUMENTED: ICD-10-PCS | Mod: CPTII,,, | Performed by: NURSE PRACTITIONER

## 2023-04-04 PROCEDURE — 99214 OFFICE O/P EST MOD 30 MIN: CPT | Mod: 25,S$PBB,, | Performed by: NURSE PRACTITIONER

## 2023-04-04 PROCEDURE — 3078F DIAST BP <80 MM HG: CPT | Mod: CPTII,,, | Performed by: NURSE PRACTITIONER

## 2023-04-04 PROCEDURE — 3008F BODY MASS INDEX DOCD: CPT | Mod: CPTII,,, | Performed by: NURSE PRACTITIONER

## 2023-04-04 RX ORDER — VALACYCLOVIR HYDROCHLORIDE 1 G/1
2000 TABLET, FILM COATED ORAL EVERY 12 HOURS
Qty: 4 TABLET | Refills: 0 | Status: SHIPPED | OUTPATIENT
Start: 2023-04-04 | End: 2023-04-05

## 2023-04-04 RX ORDER — BUSPIRONE HYDROCHLORIDE 5 MG/1
5 TABLET ORAL EVERY 8 HOURS PRN
Qty: 60 TABLET | Refills: 0 | Status: SHIPPED | OUTPATIENT
Start: 2023-04-04

## 2023-04-04 RX ORDER — VALACYCLOVIR HYDROCHLORIDE 1 G/1
2000 TABLET, FILM COATED ORAL EVERY 12 HOURS
Qty: 4 TABLET | Refills: 0 | Status: SHIPPED | OUTPATIENT
Start: 2023-04-04 | End: 2023-04-04 | Stop reason: SDUPTHER

## 2023-04-04 RX ORDER — OLOPATADINE HYDROCHLORIDE 1 MG/ML
1 SOLUTION/ DROPS OPHTHALMIC 2 TIMES DAILY PRN
Qty: 5 ML | Refills: 1 | Status: SHIPPED | OUTPATIENT
Start: 2023-04-04

## 2023-04-04 RX ORDER — BUSPIRONE HYDROCHLORIDE 5 MG/1
5 TABLET ORAL EVERY 8 HOURS PRN
Qty: 60 TABLET | Refills: 0 | Status: SHIPPED | OUTPATIENT
Start: 2023-04-04 | End: 2023-04-04 | Stop reason: SDUPTHER

## 2023-04-04 NOTE — PROGRESS NOTES
Bozena Sal, TRINI   [facility]  [address] [address]      PATIENT NAME: Stefania Padilla  : 2000  DATE: 23  MRN: [id number]      Reason for Visit / Chief Complaint: medication evaluation (Palpitations have stopped with medication, Buspar 5 mg working, refused vaccines)       History of Present Illness / Problem Focused Workflow     Stefania Padilla is a 23 y.o. [place] female presents to the clinic for anxiety and palpitation f/u. PMH sickle cell trait, and dysmenorrhea. Followed by [facility].    Today, reports anxiety improved with buspar maybe 2x/week. Reports palpitations have resolved. Has switched jobs and anxiety has improved. Reports redness to corner or eyes periodically onset in Feb; showed video on cell phone. Requesting refill on valacyclovir prn for cold sores. Continues to vape nicotine containing substance daily. Declines vaccines today. Denies chest pain, shortness of breath, cough, fever, headache, dizziness, weakness, abdominal pain, nausea, vomiting, diarrhea, constipation, dysuria, depression, anxiety, SI/HI.    Review of Systems     Review of Systems     See HPI for details    Constitutional: Denies Change in appetite. Denies Chills. Denies Fever. Denies Night sweats.  Eye: Denies Blurred vision. Denies Discharge. Denies Eye pain. Admits Itchy eyes.  ENT: Denies Decreased hearing. Denies Sore throat. Denies Swollen glands.  Respiratory: Denies Cough. Denies Shortness of breath. Denies Shortness of breath with exertion. Denies Wheezing.  Cardiovascular: Denies Chest pain at rest. Denies Chest pain with exertion. Denies Irregular heartbeat. Denies Palpitations. Denies Edema.  Gastrointestinal: Denies Abdominal pain. Denies Diarrhea. Denies Nausea. Denies Vomiting. Denies Hematemesis or Hematochezia.  Genitourinary: Denies Dysuria. Denies Urinary frequency. Denies Urinary urgency.  Denies Blood in urine.  Endocrine: Denies Cold intolerance. Denies Excessive thirst. Denies Heat intolerance. Denies Weight loss. Denies Weight gain.  Musculoskeletal: Denies Painful joints. Denies Weakness.  Integumentary: Denies Rash. Denies Itching. Denies Dry skin.  Neurologic: Denies Dizziness. Denies Fainting. Denies Headache.  Psychiatric: Denies Depression. Denies Anxiety. Denies Suicidal/Homicidal ideations.    All Other ROS: Negative except as stated in HPI.     Medical / Surgical / Social / Family History       ----------------------------  Depression      Comment:  Dx few years ago, feels fine now  Sickle cell trait     History reviewed. No pertinent surgical history.    Social History     Socioeconomic History    Marital status: Single   Tobacco Use    Smoking status: Every Day     Types: Vaping with nicotine    Smokeless tobacco: Never   Substance and Sexual Activity    Alcohol use: Yes     Comment: beer 2-4 times monthly    Drug use: Never    Sexual activity: Yes     Partners: Female   Social History Narrative    ** Merged History Encounter **          Social Determinants of Health     Food Insecurity: No Food Insecurity    Worried About Running Out of Food in the Last Year: Never true    Ran Out of Food in the Last Year: Never true   Transportation Needs: No Transportation Needs    Lack of Transportation (Medical): No    Lack of Transportation (Non-Medical): No   Physical Activity: Inactive    Days of Exercise per Week: 0 days    Minutes of Exercise per Session: 0 min   Social Connections: Moderately Isolated    Frequency of Communication with Friends and Family: Once a week    Frequency of Social Gatherings with Friends and Family: More than three times a week    Attends Caoda[employer] Services: 1 to 4 times per year    Active Member of Clubs or Organizations: No    Attends Club or Organization Meetings: Never    Marital Status: Never    Housing Stability: Low Risk     Unable to Pay for Housing in  "the Last Year: No    Number of Places Lived in the Last Year: 1    Unstable Housing in the Last Year: No        Family History   Problem Relation Age of Onset    Hypertension Maternal Grandmother     Diabetes Maternal Grandmother     Hypertension Maternal Grandfather     Diabetes Maternal Grandfather     Hypertension Father         Medications and Allergies     Medications  Current Outpatient Medications   Medication Instructions    busPIRone (BUSPAR) 5 mg, Oral, Every 8 hours PRN    etonogestreL-ethinyl estradioL (NUVARING) 0.12-0.015 mg/24 hr vaginal ring 1 each, Vaginal, Every 21 days, Insert one (1) ring vaginally and leave in place for three (3) weeks, then remove for one (1) week.    olopatadine (PATANOL) 0.1 % ophthalmic solution 1 drop, Both Eyes, 2 times daily PRN    valACYclovir (VALTREX) 2,000 mg, Oral, Every 12 hours         Allergies  Review of patient's allergies indicates:   Allergen Reactions    Furoxone [furazolidone]     Pineapple Itching       Physical Examination     /62 (BP Location: Right arm, Patient Position: Sitting, BP Method: Medium (Manual))   Pulse 77   Temp 98.4 °F (36.9 °C) (Oral)   Ht 5' 2.01" (1.575 m)   Wt 66 kg (145 lb 9.6 oz)   LMP 03/01/2023 (Exact Date)   BMI 26.62 kg/m²     Physical Exam    General: Alert and oriented, No acute distress.  Head: Normocephalic, Atraumatic.  Eye: Pupils are equal, round and reactive to light, Extraocular movements are intact, Sclera non-icteric.  Ears/Nose/Throat: Normal, Mucosa moist,Clear.  Neck/Thyroid: Supple, Non-tender, No carotid bruit, No lymphadenopathy, No JVD, Full range of motion.  Respiratory: Clear to auscultation bilaterally; No wheezes, rales or rhonchi,Non-labored respirations, Symmetrical chest wall expansion.  Cardiovascular: Regular rate and rhythm, S1/S2 normal, No murmurs, rubs or gallops.  Gastrointestinal: Soft, Non-tender, Non-distended, Normal bowel sounds, No palpable organomegaly.  Musculoskeletal: Normal " range of motion.  Integumentary: Warm, Dry, Intact, No suspicious lesions or rashes.  Extremities: No clubbing, cyanosis or edema  Neurologic: No focal deficits, Cranial Nerves II-XII are grossly intact, Motor strength normal upper and lower extremities, Sensory exam intact.  Psychiatric: Normal interaction, Coherent speech, Euthymic mood, Appropriate affect       Results     Lab Results   Component Value Date    WBC 7.8 08/11/2022    HGB 12.9 08/11/2022    HCT 36.4 (L) 08/11/2022     08/11/2022    CHOL 150 08/11/2022    TRIG 77 08/11/2022    ALT 10 08/11/2022    AST 12 08/11/2022     08/11/2022    K 4.7 08/11/2022    CREATININE 0.67 08/11/2022    BUN 9.5 08/11/2022    CO2 28 08/11/2022    TSH 4.3457 08/11/2022    HGBA1C 4.7 08/11/2022         Assessment and Plan (including Health Maintenance)     Plan:     1. Anxiety  Resolved with buspar; refilled.  Take meds as prescribed.  Practice deep breathing or abdominal breathing exercises when anxiety occurs.  Exercise daily. Get sunlight daily.  Avoid caffeine, alcohol and stimulants.  Do not use illicit drugs.  Practice positive phrases and repeat throughout the day, yoga, lavender scents or Chamomile tea will help anxiety.  Set healthy boundaries, avoid people and conversations that increase stress.  Reports any symptoms of suicidal or homicidal ideations immediately, go to nearest emergency room.    - TSH; Future    2. Allergic conjunctivitis of both eyes  Rx patanol BID prn.  Do not scratch or rub excessively.  Wash hands thoroughly.    3. Herpes labialis  Refilled valtrex prn outbreak.  Do not scratch, pick, rub, kiss, or other skin contact while present; lesions are contagious.     4. Vapes nicotine containing substance  Tobacco cessation discussed; total time 3 mins.   Pt not ready to quit.  Declines referral to Smoking Cessation program.  Discussed benefits of quitting including improved health, decreased cardiac/vascular/pulmonary/stroke risks as  well as saving money.      5. Routine screening for STI (sexually transmitted infection)  - Chlamydia/GC, PCR; Future  - Hepatitis Panel, Acute; Future  - HIV 1/2 Ag/Ab (4th Gen); Future  - SYPHILIS ANTIBODY (WITH REFLEX RPR); Future      Problem List Items Addressed This Visit          Psychiatric    Anxiety (Chronic)    Relevant Orders    TSH       Ophtho    Allergic conjunctivitis of both eyes       ID    Herpes labialis       Other    Vapes nicotine containing substance - Primary (Chronic)     Other Visit Diagnoses       Wellness examination        Relevant Orders    CBC Auto Differential    Comprehensive Metabolic Panel    Urinalysis, Reflex to Urine Culture    Screening cholesterol level        Relevant Orders    Lipid Panel              Health Maintenance Due   Topic Date Due    Pneumococcal Vaccines (Age 0-64) (1 - PCV) Never done    HPV Vaccines (1 - 2-dose series) Never done    TETANUS VACCINE  Never done    COVID-19 Vaccine (3 - Booster for Pfizer series) 09/07/2021    Influenza Vaccine (1) Never done       Follow up in about 5 months (around 9/4/2023) for Wellness with labs one week prior to appt. .        Signature:  TRINI Marie  [facility]  [address] [facility] [address]

## 2023-10-27 ENCOUNTER — LAB VISIT (OUTPATIENT)
Dept: LAB | Facility: HOSPITAL | Age: 23
End: 2023-10-27
Attending: NURSE PRACTITIONER
Payer: MEDICAID

## 2023-10-27 ENCOUNTER — OFFICE VISIT (OUTPATIENT)
Dept: GYNECOLOGY | Facility: CLINIC | Age: 23
End: 2023-10-27
Payer: MEDICAID

## 2023-10-27 VITALS
TEMPERATURE: 98 F | SYSTOLIC BLOOD PRESSURE: 99 MMHG | DIASTOLIC BLOOD PRESSURE: 60 MMHG | HEART RATE: 83 BPM | BODY MASS INDEX: 27.49 KG/M2 | RESPIRATION RATE: 20 BRPM | WEIGHT: 149.38 LBS | OXYGEN SATURATION: 100 % | HEIGHT: 62 IN

## 2023-10-27 DIAGNOSIS — N91.4 SECONDARY OLIGOMENORRHEA: ICD-10-CM

## 2023-10-27 DIAGNOSIS — Z13.220 SCREENING CHOLESTEROL LEVEL: ICD-10-CM

## 2023-10-27 DIAGNOSIS — Z11.3 ROUTINE SCREENING FOR STI (SEXUALLY TRANSMITTED INFECTION): ICD-10-CM

## 2023-10-27 DIAGNOSIS — F41.9 ANXIETY: Chronic | ICD-10-CM

## 2023-10-27 DIAGNOSIS — Z00.00 WELLNESS EXAMINATION: ICD-10-CM

## 2023-10-27 DIAGNOSIS — Z01.419 ENCOUNTER FOR ANNUAL ROUTINE GYNECOLOGICAL EXAMINATION: Primary | ICD-10-CM

## 2023-10-27 LAB
ALBUMIN SERPL-MCNC: 4.3 G/DL (ref 3.5–5)
ALBUMIN/GLOB SERPL: 1.2 RATIO (ref 1.1–2)
ALP SERPL-CCNC: 93 UNIT/L (ref 40–150)
ALT SERPL-CCNC: 14 UNIT/L (ref 0–55)
AST SERPL-CCNC: 17 UNIT/L (ref 5–34)
BASOPHILS # BLD AUTO: 0.02 X10(3)/MCL
BASOPHILS NFR BLD AUTO: 0.3 %
BILIRUB SERPL-MCNC: 1.2 MG/DL
BUN SERPL-MCNC: 9.6 MG/DL (ref 7–18.7)
C TRACH DNA SPEC QL NAA+PROBE: NOT DETECTED
CALCIUM SERPL-MCNC: 9.7 MG/DL (ref 8.4–10.2)
CHLORIDE SERPL-SCNC: 104 MMOL/L (ref 98–107)
CHOLEST SERPL-MCNC: 143 MG/DL
CHOLEST/HDLC SERPL: 4 {RATIO} (ref 0–5)
CLUE CELLS VAG QL WET PREP: ABNORMAL
CO2 SERPL-SCNC: 28 MMOL/L (ref 22–29)
CREAT SERPL-MCNC: 0.87 MG/DL (ref 0.55–1.02)
EOSINOPHIL # BLD AUTO: 0.07 X10(3)/MCL (ref 0–0.9)
EOSINOPHIL NFR BLD AUTO: 1.1 %
ERYTHROCYTE [DISTWIDTH] IN BLOOD BY AUTOMATED COUNT: 13.1 % (ref 11.5–17)
FSH SERPL-ACNC: 6.8 MIU/ML
GFR SERPLBLD CREATININE-BSD FMLA CKD-EPI: >60 MLS/MIN/1.73/M2
GLOBULIN SER-MCNC: 3.5 GM/DL (ref 2.4–3.5)
GLUCOSE SERPL-MCNC: 88 MG/DL (ref 74–100)
HAV IGM SERPL QL IA: NONREACTIVE
HBV CORE IGM SERPL QL IA: NONREACTIVE
HBV SURFACE AG SERPL QL IA: NONREACTIVE
HCT VFR BLD AUTO: 35.3 % (ref 37–47)
HCV AB SERPL QL IA: NONREACTIVE
HDLC SERPL-MCNC: 39 MG/DL (ref 35–60)
HGB BLD-MCNC: 12.5 G/DL (ref 12–16)
HIV 1+2 AB+HIV1 P24 AG SERPL QL IA: NONREACTIVE
IMM GRANULOCYTES # BLD AUTO: 0.02 X10(3)/MCL (ref 0–0.04)
IMM GRANULOCYTES NFR BLD AUTO: 0.3 %
LDLC SERPL CALC-MCNC: 71 MG/DL (ref 50–140)
LYMPHOCYTES # BLD AUTO: 1.57 X10(3)/MCL (ref 0.6–4.6)
LYMPHOCYTES NFR BLD AUTO: 24.1 %
MCH RBC QN AUTO: 29.1 PG (ref 27–31)
MCHC RBC AUTO-ENTMCNC: 35.4 G/DL (ref 33–36)
MCV RBC AUTO: 82.3 FL (ref 80–94)
MONOCYTES # BLD AUTO: 0.41 X10(3)/MCL (ref 0.1–1.3)
MONOCYTES NFR BLD AUTO: 6.3 %
N GONORRHOEA DNA SPEC QL NAA+PROBE: NOT DETECTED
NEUTROPHILS # BLD AUTO: 4.43 X10(3)/MCL (ref 2.1–9.2)
NEUTROPHILS NFR BLD AUTO: 67.9 %
NRBC BLD AUTO-RTO: 0 %
PLATELET # BLD AUTO: 251 X10(3)/MCL (ref 130–400)
PMV BLD AUTO: 9.6 FL (ref 7.4–10.4)
POTASSIUM SERPL-SCNC: 4.1 MMOL/L (ref 3.5–5.1)
PROLACTIN LEVEL (OHS): 23.73 NG/ML (ref 5.18–26.53)
PROT SERPL-MCNC: 7.8 GM/DL (ref 6.4–8.3)
RBC # BLD AUTO: 4.29 X10(6)/MCL (ref 4.2–5.4)
SODIUM SERPL-SCNC: 139 MMOL/L (ref 136–145)
SOURCE (OHS): NORMAL
T PALLIDUM AB SER QL: NONREACTIVE
T VAGINALIS VAG QL WET PREP: ABNORMAL
TESTOST SERPL-MCNC: 26.53 NG/DL (ref 13.84–53.35)
TRIGL SERPL-MCNC: 164 MG/DL (ref 37–140)
TSH SERPL-ACNC: 3.21 UIU/ML (ref 0.35–4.94)
VLDLC SERPL CALC-MCNC: 33 MG/DL
WBC # SPEC AUTO: 6.52 X10(3)/MCL (ref 4.5–11.5)
WBC #/AREA VAG WET PREP: ABNORMAL
YEAST SPEC QL WET PREP: ABNORMAL

## 2023-10-27 PROCEDURE — 85025 COMPLETE CBC W/AUTO DIFF WBC: CPT

## 2023-10-27 PROCEDURE — 86780 TREPONEMA PALLIDUM: CPT

## 2023-10-27 PROCEDURE — 80053 COMPREHEN METABOLIC PANEL: CPT

## 2023-10-27 PROCEDURE — 3008F BODY MASS INDEX DOCD: CPT | Mod: CPTII,,, | Performed by: NURSE PRACTITIONER

## 2023-10-27 PROCEDURE — 36415 COLL VENOUS BLD VENIPUNCTURE: CPT

## 2023-10-27 PROCEDURE — 80074 ACUTE HEPATITIS PANEL: CPT

## 2023-10-27 PROCEDURE — 99213 OFFICE O/P EST LOW 20 MIN: CPT | Mod: PBBFAC | Performed by: NURSE PRACTITIONER

## 2023-10-27 PROCEDURE — 3074F SYST BP LT 130 MM HG: CPT | Mod: CPTII,,, | Performed by: NURSE PRACTITIONER

## 2023-10-27 PROCEDURE — 99395 PR PREVENTIVE VISIT,EST,18-39: ICD-10-PCS | Mod: S$PBB,,, | Performed by: NURSE PRACTITIONER

## 2023-10-27 PROCEDURE — 1159F MED LIST DOCD IN RCRD: CPT | Mod: CPTII,,, | Performed by: NURSE PRACTITIONER

## 2023-10-27 PROCEDURE — 87389 HIV-1 AG W/HIV-1&-2 AB AG IA: CPT

## 2023-10-27 PROCEDURE — 1159F PR MEDICATION LIST DOCUMENTED IN MEDICAL RECORD: ICD-10-PCS | Mod: CPTII,,, | Performed by: NURSE PRACTITIONER

## 2023-10-27 PROCEDURE — 3074F PR MOST RECENT SYSTOLIC BLOOD PRESSURE < 130 MM HG: ICD-10-PCS | Mod: CPTII,,, | Performed by: NURSE PRACTITIONER

## 2023-10-27 PROCEDURE — 80061 LIPID PANEL: CPT

## 2023-10-27 PROCEDURE — 84443 ASSAY THYROID STIM HORMONE: CPT

## 2023-10-27 PROCEDURE — 3078F DIAST BP <80 MM HG: CPT | Mod: CPTII,,, | Performed by: NURSE PRACTITIONER

## 2023-10-27 PROCEDURE — 87591 N.GONORRHOEAE DNA AMP PROB: CPT | Performed by: NURSE PRACTITIONER

## 2023-10-27 PROCEDURE — 3008F PR BODY MASS INDEX (BMI) DOCUMENTED: ICD-10-PCS | Mod: CPTII,,, | Performed by: NURSE PRACTITIONER

## 2023-10-27 PROCEDURE — 84146 ASSAY OF PROLACTIN: CPT

## 2023-10-27 PROCEDURE — 87491 CHLMYD TRACH DNA AMP PROBE: CPT | Performed by: NURSE PRACTITIONER

## 2023-10-27 PROCEDURE — 99395 PREV VISIT EST AGE 18-39: CPT | Mod: S$PBB,,, | Performed by: NURSE PRACTITIONER

## 2023-10-27 PROCEDURE — 87210 SMEAR WET MOUNT SALINE/INK: CPT | Performed by: NURSE PRACTITIONER

## 2023-10-27 PROCEDURE — 84403 ASSAY OF TOTAL TESTOSTERONE: CPT

## 2023-10-27 PROCEDURE — 3078F PR MOST RECENT DIASTOLIC BLOOD PRESSURE < 80 MM HG: ICD-10-PCS | Mod: CPTII,,, | Performed by: NURSE PRACTITIONER

## 2023-10-27 PROCEDURE — 83001 ASSAY OF GONADOTROPIN (FSH): CPT

## 2023-10-27 NOTE — PROGRESS NOTES
"  Subjective:       Patient ID: Stefania Padilla is a 23 y.o. female.    Chief Complaint:  Gynecologic Exam      History of Present Illness  The patient G0 here for annual exam. Her LMP was 23. Period last 5-7 days and changes tampons 4-5x/day. Pt admits hx of irregular cycles for years, seen by GYN for oligomenorrhea in , however at that time cycles had regulated. She was placed on vaginal ring contraception for dysmenorrhea. Today, pt states she only used x2 months as she feels it caused weight gain, acne, cycles have become irregular and c/o dysmenorrhea, uses Midol and heat. Hx of Depo use in the past. Denies history of abnormal paps. Last pap -NIL & HPV neg.  Denies breast or urinary complaints. Denies pelvic pain, abnormal bleeding or discharge. Pt reports no STIs in the past and desires testing, admits she is in same sex relationship. HPV vaccinated. Pt does vape. Dep. screening 0. Denies fly hx of breast, ovarian, uterine or colon cancer.     GYN & OB History  Patient's last menstrual period was 2023 (exact date).   Date of Last Pap: 2022    Review of patient's allergies indicates:   Allergen Reactions    Furoxone [furazolidone]     Pineapple Itching     Past Medical History:   Diagnosis Date    Depression     Dx few years ago, feels fine now    Herpes simplex virus (HSV) infection     Sickle cell trait      OB History    Para Term  AB Living   0 0 0 0 0 0   SAB IAB Ectopic Multiple Live Births   0 0 0 0 0        Review of Systems  Review of Systems    Negative except for pertinent findings for positives per HPI     Objective:    Physical Exam    BP 99/60 (BP Location: Left arm, Patient Position: Sitting, BP Method: Medium (Automatic))   Pulse 83   Temp 98.4 °F (36.9 °C) (Oral)   Resp 20   Ht 5' 2" (1.575 m)   Wt 67.8 kg (149 lb 6.4 oz)   LMP 2023 (Exact Date)   SpO2 100%   BMI 27.33 kg/m²   GENERAL: Well-developed female. No acute distress.    SKIN: " Normal to inspection, warm and intact.  BREASTS: No rashes or erythema. No masses, lumps, discharge, tenderness.  ABDOMEN: Soft, non tender.  VULVA: General appearance normal; external genitalia with no lesions or erythema.  BLADDER: No tenderness.  VAGINA: Mucosa/vaginal vault pink, no abnormal discharge or lesions.  CERVIX: Pink, nulliparous appearing os, no erythema or abnormal discharge.  BIMANUAL EXAM: reveals 8 week-sized uterus. The uterus is regular, mobile, and non tender. Gokul adnexa reveal no evidence of masses, tenderness.  PSYCHIATRIC: Patient is oriented to person, place, and time. Mood and affect are normal.    Assessment:       1. Encounter for annual routine gynecological examination    2. Routine screening for STI (sexually transmitted infection)    3. Secondary oligomenorrhea       Plan:   Stefania was seen today for gynecologic exam.    Diagnoses and all orders for this visit:    Encounter for annual routine gynecological examination    Routine screening for STI (sexually transmitted infection)  -     Chlamydia/GC, PCR  -     Wet Prep, Genital  -     HIV 1/2 Ag/Ab (4th Gen); Future  -     Hepatitis C Antibody; Future  -     Hepatitis B Surface Antigen; Future  -     SYPHILIS ANTIBODY (WITH REFLEX RPR); Future    Secondary oligomenorrhea  -     US Pelvis Comp with Transvag NON-OB (xpd; Future  -     POCT urine pregnancy  -     Prolactin; Future  -     Testosterone; Future  -     Follicle Stimulating Hormone; Future    Pelvic today, pap utd per ACOG  STI screen  PCOS labs and pelvic uls  Discussed hormonal management to regulate cycles and can help with dysmenorrhea. Pt prefers to avoid hormones as she is concerned with weight gain. Discussed Progestin Challenge to induce cycle and then need for maintenance birth control. Will proceed with OCPs if she has to. Following pelvic uls results will f/u for Provera.    Healthy lifestyle choices. Importance of maintaining a healthy BMI. Healthy diet  including limiting fast foods, processed foods, foods containing high amounts of saturated fats or high sodium content. Recommend low carb, low fat diet rich in lean meat and fish, non starchy vegetables, fruits and good fat while maintaining portion control. Limit sugar intake. Recommended plenty of water, avoid carbonated beverages and high fructose corn syrup. Regular cardiovascular exercise, at least 150 minutes of cardiovascular exercise (at least 30 mins 5x/week).  Follow up in about 1 year (around 10/27/2024) for annual exam.

## 2023-10-31 ENCOUNTER — HOSPITAL ENCOUNTER (OUTPATIENT)
Dept: RADIOLOGY | Facility: HOSPITAL | Age: 23
Discharge: HOME | End: 2023-10-31
Attending: NURSE PRACTITIONER
Payer: MEDICAID

## 2023-10-31 DIAGNOSIS — N91.4 SECONDARY OLIGOMENORRHEA: ICD-10-CM

## 2023-10-31 PROCEDURE — 76830 TRANSVAGINAL US NON-OB: CPT | Mod: TC

## 2023-11-01 ENCOUNTER — TELEPHONE (OUTPATIENT)
Dept: GYNECOLOGY | Facility: CLINIC | Age: 23
End: 2023-11-01
Payer: MEDICAID

## 2023-11-01 DIAGNOSIS — N91.4 SECONDARY OLIGOMENORRHEA: Primary | ICD-10-CM

## 2023-11-01 RX ORDER — MEDROXYPROGESTERONE ACETATE 10 MG/1
10 TABLET ORAL DAILY
Qty: 10 TABLET | Refills: 0 | Status: SHIPPED | OUTPATIENT
Start: 2023-11-01 | End: 2023-11-30

## 2023-11-01 NOTE — TELEPHONE ENCOUNTER
Reviewed pelvic uls and labs with pt. PCOS labs WNL. Pelvic uls revealed Rt ovarian follicle. There might be a degree of septation of the uterus at the fundus seen by the appearance of bifurcation of the endometrial stripe. Pt advised of need to f/u with GYN if she pursues pregnancy. Currently in same sex relationship.     Will induce cycle with Provera x10 days. After bleeding starts, pt will notify clinic and would like to start OCPs.

## 2023-11-27 ENCOUNTER — TELEPHONE (OUTPATIENT)
Dept: GYNECOLOGY | Facility: CLINIC | Age: 23
End: 2023-11-27
Payer: MEDICAID

## 2023-11-27 NOTE — TELEPHONE ENCOUNTER
----- Message from Sana Haile sent at 11/27/2023  1:13 PM CST -----  Regarding: still bleeding  Above pt is calling to inform you that she is still having a cycle on the pill. It started on Monday of last week. She ask if you can call on tomorrow after 12 due to her having to go back to work.

## 2023-11-30 DIAGNOSIS — Z30.011 ENCOUNTER FOR INITIAL PRESCRIPTION OF CONTRACEPTIVE PILLS: Primary | ICD-10-CM

## 2023-11-30 RX ORDER — NORGESTIMATE AND ETHINYL ESTRADIOL 0.25-0.035
1 KIT ORAL DAILY
Qty: 28 TABLET | Refills: 12 | Status: SHIPPED | OUTPATIENT
Start: 2023-11-30 | End: 2024-11-28

## 2023-11-30 NOTE — TELEPHONE ENCOUNTER
Pt had bleeding following Provera challenge, now wants to proceed with OCPs. Rx sent to pharmacy. Education provided.

## 2023-12-21 ENCOUNTER — PATIENT MESSAGE (OUTPATIENT)
Dept: URGENT CARE | Facility: CLINIC | Age: 23
End: 2023-12-21
Payer: MEDICAID

## 2024-10-16 DIAGNOSIS — Z00.00 WELL ADULT EXAM: Primary | ICD-10-CM

## 2024-10-16 NOTE — PROGRESS NOTES
Anya Kessler, TRINI   [facility]  [address] [address]      PATIENT NAME: Stefania Padilla  : 2000  DATE: 10/17/24  MRN: [id number]      Patient PCP Information       None on File            Reason for Visit / Chief Complaint: Follow-up       History of Present Illness / Problem Focused Workflow     Stefania Padilla presents to the clinic with Follow-up     24 y.o. [place] female presents to the clinic. PMH sickle cell trait, anixety, and dysmenorrhea. Followed by [facility] clinic.    10/17/24  Pt presents for wellness. Labs not completed, is fasting and will do so today. She is requesting STD screening and pregnancy testing. She stopped taking her birth control in February and reports normal menstrual cycles since but LMP was in Aug. Home UPTs are negative. She is reporting multiple life stressors including school, her relationship, and coping with her mothers murder from 10 years ago. She is tearful in clinic. Denies SI/HI. She also admits she is having trouble focusing to study and would like ADHD eval, will refer. She declines medications at this time, agreeable to do research on SSRIs and will discuss at virtual follow up in 2 weeks. Agreeable to flu vaccine, denies any other acute complaints.             Review of Systems     Review of Systems   Constitutional:  Negative for fatigue, fever and unexpected weight change.   HENT:  Negative for ear pain, hearing loss, trouble swallowing and voice change.    Respiratory:  Negative for cough and shortness of breath.    Cardiovascular:  Negative for chest pain and palpitations.   Gastrointestinal:  Negative for abdominal pain, diarrhea and vomiting.   Genitourinary:  Negative for dysuria.   Musculoskeletal:  Negative for gait problem.   Skin:  Negative for rash and wound.   Neurological:  Negative for weakness.   Psychiatric/Behavioral:  Positive for decreased concentration  "and dysphoric mood. Negative for suicidal ideas. The patient is nervous/anxious.          Medications and Allergies     Medications  No current outpatient medications        Allergies  Review of patient's allergies indicates:   Allergen Reactions    Furoxone [furazolidone]     Pineapple Itching       Physical Examination     Visit Vitals  /72 (BP Location: Left arm, Patient Position: Sitting)   Pulse 69   Temp 97.8 °F (36.6 °C) (Oral)   Resp 16   Ht 5' 4" (1.626 m)   Wt 70 kg (154 lb 6.4 oz)   LMP 08/28/2024   SpO2 99%   BMI 26.50 kg/m²       Physical Exam  Vitals and nursing note reviewed.   Constitutional:       General: She is not in acute distress.     Appearance: She is not ill-appearing.   HENT:      Head: Normocephalic and atraumatic.      Mouth/Throat:      Mouth: Mucous membranes are moist.      Pharynx: Oropharynx is clear.   Eyes:      General: No scleral icterus.     Extraocular Movements: Extraocular movements intact.      Conjunctiva/sclera: Conjunctivae normal.      Pupils: Pupils are equal, round, and reactive to light.   Neck:      Vascular: No carotid bruit.   Cardiovascular:      Rate and Rhythm: Normal rate and regular rhythm.      Heart sounds: No murmur heard.     No friction rub. No gallop.   Pulmonary:      Effort: Pulmonary effort is normal. No respiratory distress.      Breath sounds: Normal breath sounds. No wheezing, rhonchi or rales.   Abdominal:      General: Abdomen is flat. Bowel sounds are normal. There is no distension.      Palpations: Abdomen is soft. There is no mass.      Tenderness: There is no abdominal tenderness.   Musculoskeletal:         General: Normal range of motion.      Cervical back: Normal range of motion and neck supple.   Skin:     General: Skin is warm and dry.   Neurological:      General: No focal deficit present.      Mental Status: She is alert.   Psychiatric:         Mood and Affect: Mood normal. Affect is tearful.           Results     Lab Results "   Component Value Date    WBC 6.52 10/27/2023    RBC 4.29 10/27/2023    HGB 12.5 10/27/2023    HCT 35.3 (L) 10/27/2023    MCV 82.3 10/27/2023    MCH 29.1 10/27/2023    MCHC 35.4 10/27/2023    RDW 13.1 10/27/2023     10/27/2023    MPV 9.6 10/27/2023     CMP  Sodium   Date Value Ref Range Status   10/27/2023 139 136 - 145 mmol/L Final     Potassium   Date Value Ref Range Status   10/27/2023 4.1 3.5 - 5.1 mmol/L Final     Chloride   Date Value Ref Range Status   10/27/2023 104 98 - 107 mmol/L Final     CO2   Date Value Ref Range Status   10/27/2023 28 22 - 29 mmol/L Final     Blood Urea Nitrogen   Date Value Ref Range Status   10/27/2023 9.6 7.0 - 18.7 mg/dL Final     Creatinine   Date Value Ref Range Status   10/27/2023 0.87 0.55 - 1.02 mg/dL Final     Calcium   Date Value Ref Range Status   10/27/2023 9.7 8.4 - 10.2 mg/dL Final     Albumin   Date Value Ref Range Status   10/27/2023 4.3 3.5 - 5.0 g/dL Final     Bilirubin Total   Date Value Ref Range Status   10/27/2023 1.2 <=1.5 mg/dL Final     ALP   Date Value Ref Range Status   10/27/2023 93 40 - 150 unit/L Final     AST   Date Value Ref Range Status   10/27/2023 17 5 - 34 unit/L Final     ALT   Date Value Ref Range Status   10/27/2023 14 0 - 55 unit/L Final     Lab Results   Component Value Date    CHOL 143 10/27/2023     Lab Results   Component Value Date    HDL 39 10/27/2023     Lab Results   Component Value Date    TRIG 164 (H) 10/27/2023     Lab Results   Component Value Date    VLDL 33 10/27/2023     Lab Results   Component Value Date    LDL 71.00 10/27/2023     Lab Results   Component Value Date    TSH 3.213 10/27/2023         Assessment        ICD-10-CM ICD-9-CM   1. Well adult exam  Z00.00 V70.0   2. Influenza vaccine needed  Z23 V04.81   3. Screening for STDs (sexually transmitted diseases)  Z11.3 V74.5   4. Missed period  N92.6 626.4   5. Anxiety and depression  F41.9 300.00    F32.A 311   6. Attention and concentration deficit  R41.840 799.51   7.  Vapes nicotine containing substance  Z72.0 305.1        Plan      Problem List Items Addressed This Visit       Vapes nicotine containing substance (Chronic)    Current Assessment & Plan     Continues to vape daily.  Not currently ready to quit.          Well adult exam - Primary    Current Assessment & Plan     Pt wellness visit completed today with appropriate lab work.    Topics Reviewed / Updated  Immunizations Discussed  Dicussed Healthy Diet &   Encouraged to exercise 3 x weekly  Increase Water Intake  Eat more fruits and vegetables  Avoid soda & alcohol           Anxiety and depression    Current Assessment & Plan     Referral to counseling  Declines medications, will research SSRIs  F/U 2 weeks virtual  Read positive daily meditations, avoid negative media, set healthy boundaries.  Exercise daily, keep consistent sleep pattern, eat a healthy diet.  Establish good social support, make changes to reduce stress.  Do not drink alcohol or use illicit drugs.  Reports any symptoms of suicidal/homicidal ideations or self harm immediately, go to nearest emergency room.           Relevant Orders    Ambulatory referral/consult to Psychiatry    Ambulatory referral/consult to Psychiatry    Attention and concentration deficit    Current Assessment & Plan     Discussed likely due to anxiety/depression  Referral to psych for ADHD eval         Relevant Orders    Ambulatory referral/consult to Psychiatry     Other Visit Diagnoses       Influenza vaccine needed        Relevant Medications    influenza (Flulaval, Fluzone, Fluarix) 45 mcg/0.5 mL IM vaccine (> or = 6 mo) 0.5 mL (Completed)    Screening for STDs (sexually transmitted diseases)        Relevant Orders    Chlamydia/GC, PCR    Hepatitis Panel, Acute    HIV 1/2 Ag/Ab (4th Gen)    SYPHILIS ANTIBODY (WITH REFLEX RPR)    Missed period        Relevant Orders    HCG, Quantitative            Future Appointments   Date Time Provider Department Center   10/31/2024 10:10 AM  Katie Frias, ANP [facility] [place] [facility]   11/1/2024 10:00 AM [facility]ess[facility], [facility]        Follow up in about 2 weeks (around 10/31/2024) for Anxiety/Depression, Lab Review, Virtual Visit.      Signature:      [facility]  [address] [id number]    Date of encounter: 10/17/24

## 2024-10-17 ENCOUNTER — LAB VISIT (OUTPATIENT)
Dept: LAB | Facility: HOSPITAL | Age: 24
End: 2024-10-17
Payer: MEDICAID

## 2024-10-17 ENCOUNTER — OFFICE VISIT (OUTPATIENT)
Dept: INTERNAL MEDICINE | Facility: CLINIC | Age: 24
End: 2024-10-17
Payer: MEDICAID

## 2024-10-17 VITALS
BODY MASS INDEX: 26.36 KG/M2 | SYSTOLIC BLOOD PRESSURE: 108 MMHG | DIASTOLIC BLOOD PRESSURE: 72 MMHG | WEIGHT: 154.38 LBS | HEART RATE: 69 BPM | HEIGHT: 64 IN | RESPIRATION RATE: 16 BRPM | TEMPERATURE: 98 F | OXYGEN SATURATION: 99 %

## 2024-10-17 DIAGNOSIS — Z00.00 WELL ADULT EXAM: ICD-10-CM

## 2024-10-17 DIAGNOSIS — Z23 INFLUENZA VACCINE NEEDED: ICD-10-CM

## 2024-10-17 DIAGNOSIS — N92.6 MISSED PERIOD: ICD-10-CM

## 2024-10-17 DIAGNOSIS — Z00.00 WELL ADULT EXAM: Primary | ICD-10-CM

## 2024-10-17 DIAGNOSIS — R41.840 ATTENTION AND CONCENTRATION DEFICIT: ICD-10-CM

## 2024-10-17 DIAGNOSIS — F41.9 ANXIETY AND DEPRESSION: ICD-10-CM

## 2024-10-17 DIAGNOSIS — Z72.0 VAPES NICOTINE CONTAINING SUBSTANCE: Chronic | ICD-10-CM

## 2024-10-17 DIAGNOSIS — Z11.3 SCREENING FOR STDS (SEXUALLY TRANSMITTED DISEASES): ICD-10-CM

## 2024-10-17 DIAGNOSIS — F32.A ANXIETY AND DEPRESSION: ICD-10-CM

## 2024-10-17 LAB
ALBUMIN SERPL-MCNC: 4.3 G/DL (ref 3.5–5)
ALBUMIN/GLOB SERPL: 1.3 RATIO (ref 1.1–2)
ALP SERPL-CCNC: 86 UNIT/L (ref 40–150)
ALT SERPL-CCNC: 7 UNIT/L (ref 0–55)
ANION GAP SERPL CALC-SCNC: 6 MEQ/L
AST SERPL-CCNC: 11 UNIT/L (ref 5–34)
B-HCG FREE SERPL-ACNC: <2.42 MIU/ML
BACTERIA #/AREA URNS AUTO: ABNORMAL /HPF
BASOPHILS # BLD AUTO: 0.02 X10(3)/MCL
BASOPHILS NFR BLD AUTO: 0.3 %
BILIRUB SERPL-MCNC: 1 MG/DL
BILIRUB UR QL STRIP.AUTO: NEGATIVE
BUN SERPL-MCNC: 8.1 MG/DL (ref 7–18.7)
CALCIUM SERPL-MCNC: 10.1 MG/DL (ref 8.4–10.2)
CHLORIDE SERPL-SCNC: 107 MMOL/L (ref 98–107)
CHOLEST SERPL-MCNC: 138 MG/DL
CHOLEST/HDLC SERPL: 3 {RATIO} (ref 0–5)
CLARITY UR: CLEAR
CO2 SERPL-SCNC: 27 MMOL/L (ref 22–29)
COLOR UR AUTO: ABNORMAL
CREAT SERPL-MCNC: 0.79 MG/DL (ref 0.55–1.02)
CREAT/UREA NIT SERPL: 10
EOSINOPHIL # BLD AUTO: 0.08 X10(3)/MCL (ref 0–0.9)
EOSINOPHIL NFR BLD AUTO: 1 %
ERYTHROCYTE [DISTWIDTH] IN BLOOD BY AUTOMATED COUNT: 13.8 % (ref 11.5–17)
EST. AVERAGE GLUCOSE BLD GHB EST-MCNC: 91.1 MG/DL
GFR SERPLBLD CREATININE-BSD FMLA CKD-EPI: >60 ML/MIN/1.73/M2
GLOBULIN SER-MCNC: 3.3 GM/DL (ref 2.4–3.5)
GLUCOSE SERPL-MCNC: 99 MG/DL (ref 74–100)
GLUCOSE UR QL STRIP: NORMAL
HAV IGM SERPL QL IA: NONREACTIVE
HBA1C MFR BLD: 4.8 %
HBV CORE IGM SERPL QL IA: NONREACTIVE
HBV SURFACE AG SERPL QL IA: NONREACTIVE
HCT VFR BLD AUTO: 37.4 % (ref 37–47)
HCV AB SERPL QL IA: NONREACTIVE
HDLC SERPL-MCNC: 42 MG/DL (ref 35–60)
HGB BLD-MCNC: 13.1 G/DL (ref 12–16)
HGB UR QL STRIP: ABNORMAL
HIV 1+2 AB+HIV1 P24 AG SERPL QL IA: NONREACTIVE
HYALINE CASTS #/AREA URNS LPF: ABNORMAL /LPF
IMM GRANULOCYTES # BLD AUTO: 0.02 X10(3)/MCL (ref 0–0.04)
IMM GRANULOCYTES NFR BLD AUTO: 0.3 %
KETONES UR QL STRIP: NEGATIVE
LDLC SERPL CALC-MCNC: 77 MG/DL (ref 50–140)
LEUKOCYTE ESTERASE UR QL STRIP: NEGATIVE
LYMPHOCYTES # BLD AUTO: 1.8 X10(3)/MCL (ref 0.6–4.6)
LYMPHOCYTES NFR BLD AUTO: 23.1 %
MCH RBC QN AUTO: 29.1 PG (ref 27–31)
MCHC RBC AUTO-ENTMCNC: 35 G/DL (ref 33–36)
MCV RBC AUTO: 83.1 FL (ref 80–94)
MONOCYTES # BLD AUTO: 0.5 X10(3)/MCL (ref 0.1–1.3)
MONOCYTES NFR BLD AUTO: 6.4 %
MUCOUS THREADS URNS QL MICRO: ABNORMAL /LPF
NEUTROPHILS # BLD AUTO: 5.38 X10(3)/MCL (ref 2.1–9.2)
NEUTROPHILS NFR BLD AUTO: 68.9 %
NITRITE UR QL STRIP: NEGATIVE
NRBC BLD AUTO-RTO: 0 %
PH UR STRIP: 6 [PH]
PLATELET # BLD AUTO: 285 X10(3)/MCL (ref 130–400)
PMV BLD AUTO: 9.8 FL (ref 7.4–10.4)
POTASSIUM SERPL-SCNC: 4.3 MMOL/L (ref 3.5–5.1)
PROT SERPL-MCNC: 7.6 GM/DL (ref 6.4–8.3)
PROT UR QL STRIP: NEGATIVE
RBC # BLD AUTO: 4.5 X10(6)/MCL (ref 4.2–5.4)
RBC #/AREA URNS AUTO: ABNORMAL /HPF
SODIUM SERPL-SCNC: 140 MMOL/L (ref 136–145)
SP GR UR STRIP.AUTO: 1.02 (ref 1–1.03)
SQUAMOUS #/AREA URNS LPF: ABNORMAL /HPF
T PALLIDUM AB SER QL: NONREACTIVE
T4 FREE SERPL-MCNC: 1.21 NG/DL (ref 0.7–1.48)
TRIGL SERPL-MCNC: 95 MG/DL (ref 37–140)
TSH SERPL-ACNC: 4.06 UIU/ML (ref 0.35–4.94)
UROBILINOGEN UR STRIP-ACNC: NORMAL
VLDLC SERPL CALC-MCNC: 19 MG/DL
WBC # BLD AUTO: 7.8 X10(3)/MCL (ref 4.5–11.5)
WBC #/AREA URNS AUTO: ABNORMAL /HPF

## 2024-10-17 PROCEDURE — 84702 CHORIONIC GONADOTROPIN TEST: CPT

## 2024-10-17 PROCEDURE — 36415 COLL VENOUS BLD VENIPUNCTURE: CPT

## 2024-10-17 PROCEDURE — 90656 IIV3 VACC NO PRSV 0.5 ML IM: CPT | Mod: PBBFAC

## 2024-10-17 PROCEDURE — 80074 ACUTE HEPATITIS PANEL: CPT

## 2024-10-17 PROCEDURE — 80053 COMPREHEN METABOLIC PANEL: CPT

## 2024-10-17 PROCEDURE — 87389 HIV-1 AG W/HIV-1&-2 AB AG IA: CPT

## 2024-10-17 PROCEDURE — 80061 LIPID PANEL: CPT

## 2024-10-17 PROCEDURE — 84443 ASSAY THYROID STIM HORMONE: CPT

## 2024-10-17 PROCEDURE — 83036 HEMOGLOBIN GLYCOSYLATED A1C: CPT

## 2024-10-17 PROCEDURE — 85025 COMPLETE CBC W/AUTO DIFF WBC: CPT

## 2024-10-17 PROCEDURE — 84439 ASSAY OF FREE THYROXINE: CPT

## 2024-10-17 PROCEDURE — 90471 IMMUNIZATION ADMIN: CPT | Mod: PBBFAC

## 2024-10-17 PROCEDURE — 81001 URINALYSIS AUTO W/SCOPE: CPT

## 2024-10-17 PROCEDURE — 99214 OFFICE O/P EST MOD 30 MIN: CPT | Mod: PBBFAC,25

## 2024-10-17 PROCEDURE — 86780 TREPONEMA PALLIDUM: CPT

## 2024-10-17 RX ADMIN — INFLUENZA VIRUS VACCINE 0.5 ML: 15; 15; 15 SUSPENSION INTRAMUSCULAR at 08:10

## 2024-10-17 NOTE — ASSESSMENT & PLAN NOTE
Referral to counseling  Declines medications, will research SSRIs  F/U 2 weeks virtual  Read positive daily meditations, avoid negative media, set healthy boundaries.  Exercise daily, keep consistent sleep pattern, eat a healthy diet.  Establish good social support, make changes to reduce stress.  Do not drink alcohol or use illicit drugs.  Reports any symptoms of suicidal/homicidal ideations or self harm immediately, go to nearest emergency room.

## 2024-10-31 ENCOUNTER — OFFICE VISIT (OUTPATIENT)
Dept: GYNECOLOGY | Facility: CLINIC | Age: 24
End: 2024-10-31
Payer: MEDICAID

## 2024-10-31 VITALS
HEIGHT: 64 IN | RESPIRATION RATE: 20 BRPM | OXYGEN SATURATION: 100 % | DIASTOLIC BLOOD PRESSURE: 68 MMHG | WEIGHT: 149 LBS | SYSTOLIC BLOOD PRESSURE: 105 MMHG | BODY MASS INDEX: 25.44 KG/M2 | HEART RATE: 68 BPM | TEMPERATURE: 98 F

## 2024-10-31 DIAGNOSIS — N91.4 SECONDARY OLIGOMENORRHEA: ICD-10-CM

## 2024-10-31 DIAGNOSIS — Z72.0 VAPES NICOTINE CONTAINING SUBSTANCE: Chronic | ICD-10-CM

## 2024-10-31 DIAGNOSIS — Z11.3 ROUTINE SCREENING FOR STI (SEXUALLY TRANSMITTED INFECTION): ICD-10-CM

## 2024-10-31 DIAGNOSIS — Z01.419 ENCOUNTER FOR ANNUAL ROUTINE GYNECOLOGICAL EXAMINATION: Primary | ICD-10-CM

## 2024-10-31 LAB
C TRACH DNA SPEC QL NAA+PROBE: NOT DETECTED
CLUE CELLS VAG QL WET PREP: NORMAL
N GONORRHOEA DNA SPEC QL NAA+PROBE: NOT DETECTED
SOURCE (OHS): NORMAL
T VAGINALIS VAG QL WET PREP: NORMAL
WBC #/AREA VAG WET PREP: NORMAL
YEAST SPEC QL WET PREP: NORMAL

## 2024-10-31 PROCEDURE — 3008F BODY MASS INDEX DOCD: CPT | Mod: CPTII,,, | Performed by: NURSE PRACTITIONER

## 2024-10-31 PROCEDURE — 3074F SYST BP LT 130 MM HG: CPT | Mod: CPTII,,, | Performed by: NURSE PRACTITIONER

## 2024-10-31 PROCEDURE — 3044F HG A1C LEVEL LT 7.0%: CPT | Mod: CPTII,,, | Performed by: NURSE PRACTITIONER

## 2024-10-31 PROCEDURE — 87210 SMEAR WET MOUNT SALINE/INK: CPT | Performed by: NURSE PRACTITIONER

## 2024-10-31 PROCEDURE — 3078F DIAST BP <80 MM HG: CPT | Mod: CPTII,,, | Performed by: NURSE PRACTITIONER

## 2024-10-31 PROCEDURE — 99395 PREV VISIT EST AGE 18-39: CPT | Mod: S$PBB,,, | Performed by: NURSE PRACTITIONER

## 2024-10-31 PROCEDURE — 87491 CHLMYD TRACH DNA AMP PROBE: CPT | Performed by: NURSE PRACTITIONER

## 2024-10-31 PROCEDURE — 99213 OFFICE O/P EST LOW 20 MIN: CPT | Mod: PBBFAC | Performed by: NURSE PRACTITIONER

## 2024-10-31 PROCEDURE — 1159F MED LIST DOCD IN RCRD: CPT | Mod: CPTII,,, | Performed by: NURSE PRACTITIONER

## 2024-10-31 PROCEDURE — 87591 N.GONORRHOEAE DNA AMP PROB: CPT | Performed by: NURSE PRACTITIONER

## 2024-10-31 RX ORDER — MEDROXYPROGESTERONE ACETATE 10 MG/1
10 TABLET ORAL DAILY
Qty: 10 TABLET | Refills: 0 | Status: SHIPPED | OUTPATIENT
Start: 2024-10-31 | End: 2024-11-10

## 2024-11-01 ENCOUNTER — OFFICE VISIT (OUTPATIENT)
Dept: INTERNAL MEDICINE | Facility: CLINIC | Age: 24
End: 2024-11-01
Payer: MEDICAID

## 2024-11-01 DIAGNOSIS — F32.A ANXIETY AND DEPRESSION: Primary | ICD-10-CM

## 2024-11-01 DIAGNOSIS — F41.9 ANXIETY AND DEPRESSION: Primary | ICD-10-CM

## 2024-11-01 RX ORDER — ESCITALOPRAM OXALATE 5 MG/1
5 TABLET ORAL DAILY
Qty: 90 TABLET | Refills: 0 | Status: SHIPPED | OUTPATIENT
Start: 2024-11-01 | End: 2025-01-30

## 2024-11-15 ENCOUNTER — PATIENT MESSAGE (OUTPATIENT)
Dept: BEHAVIORAL HEALTH | Facility: CLINIC | Age: 24
End: 2024-11-15
Payer: MEDICAID

## 2024-11-15 ENCOUNTER — OFFICE VISIT (OUTPATIENT)
Dept: BEHAVIORAL HEALTH | Facility: CLINIC | Age: 24
End: 2024-11-15
Payer: MEDICAID

## 2024-11-15 VITALS
BODY MASS INDEX: 25.51 KG/M2 | OXYGEN SATURATION: 100 % | TEMPERATURE: 98 F | HEART RATE: 62 BPM | DIASTOLIC BLOOD PRESSURE: 77 MMHG | WEIGHT: 148.63 LBS | SYSTOLIC BLOOD PRESSURE: 112 MMHG

## 2024-11-15 DIAGNOSIS — F90.9 ADULT ADHD: Primary | ICD-10-CM

## 2024-11-15 DIAGNOSIS — F41.1 GAD (GENERALIZED ANXIETY DISORDER): ICD-10-CM

## 2024-11-15 DIAGNOSIS — F33.1 MODERATE EPISODE OF RECURRENT MAJOR DEPRESSIVE DISORDER: Primary | ICD-10-CM

## 2024-11-15 DIAGNOSIS — F90.9 ADULT ADHD: ICD-10-CM

## 2024-11-15 LAB
AMPHET UR QL SCN: NEGATIVE
BARBITURATE SCN PRESENT UR: NEGATIVE
BENZODIAZ UR QL SCN: NEGATIVE
CANNABINOIDS UR QL SCN: NEGATIVE
COCAINE UR QL SCN: NEGATIVE
FENTANYL UR QL SCN: NEGATIVE
MDMA UR QL SCN: NEGATIVE
OPIATES UR QL SCN: NEGATIVE
PCP UR QL: NEGATIVE
PH UR: 7.5 [PH] (ref 3–11)
SPECIFIC GRAVITY, URINE AUTO (.000) (OHS): 1.01 (ref 1–1.03)

## 2024-11-15 PROCEDURE — 80307 DRUG TEST PRSMV CHEM ANLYZR: CPT | Performed by: STUDENT IN AN ORGANIZED HEALTH CARE EDUCATION/TRAINING PROGRAM

## 2024-11-15 PROCEDURE — 99213 OFFICE O/P EST LOW 20 MIN: CPT | Mod: PBBFAC,PN | Performed by: STUDENT IN AN ORGANIZED HEALTH CARE EDUCATION/TRAINING PROGRAM

## 2024-11-15 RX ORDER — METHYLPHENIDATE HYDROCHLORIDE 10 MG/1
10 TABLET ORAL 2 TIMES DAILY
Qty: 28 TABLET | Refills: 0 | Status: SHIPPED | OUTPATIENT
Start: 2024-11-15 | End: 2024-11-29
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
## 2024-11-18 ENCOUNTER — PATIENT MESSAGE (OUTPATIENT)
Dept: GYNECOLOGY | Facility: CLINIC | Age: 24
End: 2024-11-18
Payer: MEDICAID

## 2024-11-18 ENCOUNTER — TELEPHONE (OUTPATIENT)
Dept: GYNECOLOGY | Facility: CLINIC | Age: 24
End: 2024-11-18
Payer: MEDICAID

## 2024-11-18 DIAGNOSIS — Z30.015 ENCOUNTER FOR INITIAL PRESCRIPTION OF VAGINAL RING HORMONAL CONTRACEPTIVE: Primary | ICD-10-CM

## 2024-11-18 RX ORDER — ETONOGESTREL AND ETHINYL ESTRADIOL VAGINAL RING .015; .12 MG/D; MG/D
1 RING VAGINAL
Qty: 1 EACH | Refills: 12 | Status: SHIPPED | OUTPATIENT
Start: 2024-11-18

## 2024-11-18 NOTE — TELEPHONE ENCOUNTER
Pt can start vaginal ring on Sunday. No unprotected intercourse from now until starting ring.    Denies any medical hx; no hx of blood clots; PE, DVT, MI, CVA. No obvious contraindications to use of birth control vaginal ring. Will start birth control Ring, instruct to insert and leave vaginal ring in vaginal for 3 weeks. On week 4 remove x1 week at which time cycle may occur. Start new vaginal ring 1 week later and repeat same steps. Insertion and removal should occur on the same day of the week each time.  Use back up such as condoms for first month and all sexual encounters to prevent STDs.

## 2024-11-18 NOTE — TELEPHONE ENCOUNTER
Spoke to patient and gave directives per NP for birth control usage. Patient verbalized understanding.

## 2024-12-09 ENCOUNTER — PATIENT MESSAGE (OUTPATIENT)
Dept: INTERNAL MEDICINE | Facility: CLINIC | Age: 24
End: 2024-12-09
Payer: MEDICAID